# Patient Record
Sex: MALE | Race: WHITE | Employment: FULL TIME | URBAN - NONMETROPOLITAN AREA
[De-identification: names, ages, dates, MRNs, and addresses within clinical notes are randomized per-mention and may not be internally consistent; named-entity substitution may affect disease eponyms.]

---
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
## 2022-01-13 ENCOUNTER — HOSPITAL ENCOUNTER (EMERGENCY)
Age: 22
Discharge: HOME | End: 2022-01-13
Payer: COMMERCIAL

## 2022-01-13 VITALS
OXYGEN SATURATION: 96 % | HEART RATE: 96 BPM | SYSTOLIC BLOOD PRESSURE: 135 MMHG | RESPIRATION RATE: 16 BRPM | DIASTOLIC BLOOD PRESSURE: 83 MMHG | TEMPERATURE: 98.1 F

## 2022-01-13 DIAGNOSIS — L02.413 ABSCESS OF RIGHT FOREARM: Primary | ICD-10-CM

## 2022-01-13 PROCEDURE — 2500000003 HC RX 250 WO HCPCS: Performed by: EMERGENCY MEDICINE

## 2022-01-13 PROCEDURE — 99213 OFFICE O/P EST LOW 20 MIN: CPT | Performed by: EMERGENCY MEDICINE

## 2022-01-13 PROCEDURE — 99213 OFFICE O/P EST LOW 20 MIN: CPT

## 2022-01-13 PROCEDURE — 96372 THER/PROPH/DIAG INJ SC/IM: CPT

## 2022-01-13 PROCEDURE — 87070 CULTURE OTHR SPECIMN AEROBIC: CPT

## 2022-01-13 PROCEDURE — 87205 SMEAR GRAM STAIN: CPT

## 2022-01-13 PROCEDURE — 6360000002 HC RX W HCPCS: Performed by: EMERGENCY MEDICINE

## 2022-01-13 PROCEDURE — 87186 SC STD MICRODIL/AGAR DIL: CPT

## 2022-01-13 PROCEDURE — 87147 CULTURE TYPE IMMUNOLOGIC: CPT

## 2022-01-13 PROCEDURE — 10060 I&D ABSCESS SIMPLE/SINGLE: CPT | Performed by: EMERGENCY MEDICINE

## 2022-01-13 PROCEDURE — 87077 CULTURE AEROBIC IDENTIFY: CPT

## 2022-01-13 RX ORDER — CEPHALEXIN 500 MG/1
500 CAPSULE ORAL 4 TIMES DAILY
Qty: 40 CAPSULE | Refills: 0 | Status: SHIPPED | OUTPATIENT
Start: 2022-01-13 | End: 2022-01-17 | Stop reason: ALTCHOICE

## 2022-01-13 RX ORDER — CLINDAMYCIN HYDROCHLORIDE 300 MG/1
300 CAPSULE ORAL 3 TIMES DAILY
Qty: 30 CAPSULE | Refills: 0 | Status: SHIPPED | OUTPATIENT
Start: 2022-01-13 | End: 2022-01-17 | Stop reason: ALTCHOICE

## 2022-01-13 RX ORDER — GREEN TEA/HOODIA GORDONII 315-12.5MG
1 CAPSULE ORAL 2 TIMES DAILY
Qty: 60 TABLET | Refills: 0 | Status: SHIPPED | OUTPATIENT
Start: 2022-01-13 | End: 2022-02-12

## 2022-01-13 RX ADMIN — LIDOCAINE HYDROCHLORIDE 1000 MG: 10 INJECTION, SOLUTION EPIDURAL; INFILTRATION; INTRACAUDAL; PERINEURAL at 19:30

## 2022-01-13 ASSESSMENT — PAIN DESCRIPTION - LOCATION: LOCATION: ARM

## 2022-01-13 ASSESSMENT — ENCOUNTER SYMPTOMS
COUGH: 0
ABDOMINAL PAIN: 0
SHORTNESS OF BREATH: 0

## 2022-01-13 ASSESSMENT — PAIN DESCRIPTION - DESCRIPTORS: DESCRIPTORS: SHARP

## 2022-01-13 ASSESSMENT — PAIN DESCRIPTION - ORIENTATION: ORIENTATION: RIGHT

## 2022-01-13 ASSESSMENT — PAIN SCALES - GENERAL: PAINLEVEL_OUTOF10: 8

## 2022-01-13 ASSESSMENT — PAIN DESCRIPTION - FREQUENCY: FREQUENCY: CONTINUOUS

## 2022-01-13 NOTE — Clinical Note
Tatevibha Hay was seen and treated in our emergency department on 1/13/2022. He may return to work on 01/17/2022. If you have any questions or concerns, please don't hesitate to call.       JEWELL Gr - CNP

## 2022-01-14 NOTE — ED NOTES
Dressing applied to wound right arm .  Became light headed, was layed down and given gatorade to drink     Danika Brown RN  01/13/22 1956

## 2022-01-14 NOTE — ED PROVIDER NOTES
[facility] En[facility]       CHIEF COMPLAINT       Chief Complaint   Patient presents with    Abscess     right forearm x 2 months as a rash  and  from scratching it has  started swelling last  week with drainage-  insulin dependent diabetic. Nurses Notes reviewed and I agree except as noted in the HPI. HISTORY OF PRESENT ILLNESS   Davi Malik is a 25 y.o. male who presents for complaints of abscess and cellulitis to his right forearm. Patient states he had a nonspecific rash that was itchy to the right forearm. This was there for several weeks. Patient states he was irritated and scratching at it. He states started having mild swelling a few days ago. Today he has a large area of redness that is very tender and sore. No fevers. Patient is a type I diabetic on insulin. HPI    REVIEW OF SYSTEMS     Review of Systems   Constitutional: Negative for activity change, fatigue and fever. Respiratory: Negative for cough and shortness of breath. Gastrointestinal: Negative for abdominal pain. Skin: Positive for wound (abscess right forearm). Neurological: Positive for headaches. Negative for dizziness. PAST MEDICAL HISTORY         Diagnosis Date    Attention deficit hyperactivity disorder (ADHD), predominantly inattentive type 10/15/2019    Hydronephrosis     Type 1 diabetes mellitus without complication ([facility] [address] 2/51/6575       SURGICALHISTORY     Patient  has no past surgical history on file. CURRENT MEDICATIONS       Previous Medications    AMPHETAMINE-DEXTROAMPHETAMINE (ADDERALL XR) 20 MG EXTENDED RELEASE CAPSULE    Take 1 capsule by mouth every morning for 30 days. ATORVASTATIN (LIPITOR) 20 MG TABLET    Take 1 tablet by mouth daily    CICLOPIROX (PENLAC) 8 % SOLUTION    Apply topically nightly for up to 48 weeks. FREESTYLE LITE STRIP    use 1 TEST STRIP to TEST BLOOD SUGAR UP TO 7 TIMES A DAY.     INSULIN ASPART (NOVOLOG) 100 UNIT/ML INJECTION VIAL    Inject into the skin 3 times daily (before meals)    INSULIN GLARGINE (LANTUS) 100 UNIT/ML INJECTION VIAL    Inject 31 Units into the skin nightly        ALLERGIES     Patient is is allergic to codeine and sulfa antibiotics. Patients   Immunization History   Administered Date(s) Administered    DTaP vaccine 2002, 01/09/2003, 04/01/2003, 11/18/2003, 09/11/2007    HPV 9-valent Stephen Schmitt) 09/25/2018, 11/26/2018, 03/25/2019    Hep B/Hib (Comvax) 2002, 01/09/2003, 09/11/2003    Hepatitis A Ped/Adol (Havrix, Vaqta) 08/12/2020    Influenza A ([id number]) Vaccine PF IM 12/02/2009    Influenza Virus Vaccine 09/23/2016, 09/25/2018, 08/14/2019    Influenza, Patrick Sergeant, IM, (6 mo and older Fluzone, Flulaval, Fluarix and 3 yrs and older Afluria) 11/06/2017    Influenza, Quadv, IM, PF (6 mo and older Fluzone, Flulaval, Fluarix, and 3 yrs and older Afluria) 09/11/2019    MMR 09/11/2003, 09/11/2007    Meningococcal MCV4P (Menactra) 09/25/2018    Pneumococcal Conjugate 7-valent (Drenda Slough) 2002, 01/09/2003, 11/18/2003    Polio IPV (IPOL) 2002, 01/09/2003, 04/01/2003, 09/11/2007    Tdap (Boostrix, Adacel) 10/07/2015    Varicella (Varivax) 09/11/2003, 09/11/2007       FAMILY HISTORY     Patient's family history is not on file. SOCIAL HISTORY     Patient  reports that he has never smoked. He has never used smokeless tobacco. He reports previous alcohol use. He reports previous drug use. PHYSICAL EXAM     ED TRIAGE VITALS  BP: 135/83, Temp: 98.1 °F (36.7 °C), Heart Rate: 96, Resp: 16, SpO2: 96 %,Estimated body mass index is 25.57 kg/m² as calculated from the following:    Height as of 11/17/21: 5' 8\" (1.727 m). Weight as of 11/17/21: 168 lb 3.2 oz (76.3 kg). ,No LMP for male patient. Physical Exam  Constitutional:       General: He is not in acute distress. Appearance: He is normal weight. He is not ill-appearing. HENT:      Head: Normocephalic.    Cardiovascular: Rate and Rhythm: Normal rate. Pulmonary:      Effort: Pulmonary effort is normal.   Musculoskeletal:      Right forearm: Swelling present. Arms:       Comments: Patient has large abscess to the right proximal forearm. Palpable swelling over approximate 5 cm diameter area. There is an area that has spontaneous drainage with purulent drainage   Skin:     General: Skin is warm. Capillary Refill: Capillary refill takes less than 2 seconds. Neurological:      Mental Status: He is alert. Psychiatric:         Mood and Affect: Mood normal.         DIAGNOSTIC RESULTS     Labs:No results found for this visit on 01/13/22. IMAGING:    No orders to display         EKG:      URGENT CARE COURSE:     Vitals:    01/13/22 1836   BP: 135/83   Pulse: 96   Resp: 16   Temp: 98.1 °F (36.7 °C)   TempSrc: Temporal   SpO2: 96%       Medications   cefTRIAXone (ROCEPHIN) 1,000 mg in lidocaine 1 % 2.86 mL IM Injection (1,000 mg IntraMUSCular Given 1/13/22 1930)            PROCEDURES:  Incision/Drainage    Date/Time: 1/13/2022 7:24 PM  Performed by: JEWELL Pate CNP  Authorized by: JEWELL Pate CNP     Consent:     Consent obtained:  Verbal    Consent given by:  Patient  Location:     Type:  Abscess    Size:  5cm    Location:  Upper extremity    Upper extremity location:  Arm    Arm location:  R lower arm  Pre-procedure details:     Skin preparation:  Betadine  Anesthesia (see MAR for exact dosages): Anesthesia method:  Local infiltration    Local anesthetic:  Lidocaine 1% w/o epi  Procedure type:     Complexity:  Simple  Procedure details:     Incision types:  Stab incision    Incision depth:  Dermal    Scalpel blade:  11    Wound management:  Probed and deloculated and irrigated with saline    Drainage:  Bloody and purulent    Drainage amount: Moderate    Wound treatment:  Wound left open    Packing materials:  1/4 in gauze  Post-procedure details:     Patient tolerance of procedure:   Tolerated well, no immediate complications        FINAL IMPRESSION      1. Abscess of right forearm          DISPOSITION/ PLAN   Patient presents for large abscess to his right forearm. Cellulitis. I&D was performed with small to moderate amount purulent drainage and blood removed. Packing placed. Patient was given injection of Rocephin at the urgent care. He will be placed on clindamycin, Keflex and probiotic for treatment at home. Advised use warm compresses Tylenol as needed for pain. Follow-up primary care provider for reevaluation next week.   Return if worse        PATIENT REFERRED TO:  JEWELL Medina CNP  [address] / [place] [address]      DISCHARGE MEDICATIONS:  New Prescriptions    CEPHALEXIN (KEFLEX) 500 MG CAPSULE    Take 1 capsule by mouth 4 times daily for 10 days    CLINDAMYCIN (CLEOCIN) 300 MG CAPSULE    Take 1 capsule by mouth 3 times daily for 10 days    PROBIOTIC ACIDOPHILUS (FLORANEX) TABS    Take 1 tablet by mouth 2 times daily       Discontinued Medications    No medications on file       Current Discharge Medication List          JEWELL Padilla CNP    (Please note that portions of this note were completed with a voice recognition program. Efforts were made to edit the dictations but occasionally words are mis-transcribed.)          JEWELL Padilla CNP  01/13/22 1955

## 2022-01-15 LAB
AEROBIC CULTURE: ABNORMAL
AEROBIC CULTURE: ABNORMAL
GRAM STAIN RESULT: ABNORMAL
ORGANISM: ABNORMAL

## 2022-01-17 ENCOUNTER — TELEPHONE (OUTPATIENT)
Dept: FAMILY MEDICINE CLINIC | Age: 22
End: 2022-01-17

## 2022-01-17 DIAGNOSIS — L03.113 CELLULITIS OF RIGHT UPPER EXTREMITY: Primary | ICD-10-CM

## 2022-01-17 RX ORDER — DOXYCYCLINE HYCLATE 100 MG
100 TABLET ORAL 2 TIMES DAILY
Qty: 20 TABLET | Refills: 0 | Status: SHIPPED | OUTPATIENT
Start: 2022-01-17 | End: 2022-01-27

## 2022-01-17 NOTE — TELEPHONE ENCOUNTER
----- Message from JEWELL Humphries - CNP sent at 1/17/2022  8:23 AM EST -----  Let Angela Nunes know the culture from his arm is growing staph, but the clindamycin isn't going to appropriate treat it. He should stop it, and i'm sending in alternative Abx Rx which should take care of it. He can also stop the Keflex.

## 2022-02-16 ENCOUNTER — OFFICE VISIT (OUTPATIENT)
Dept: FAMILY MEDICINE CLINIC | Age: 22
End: 2022-02-16
Payer: COMMERCIAL

## 2022-02-16 VITALS
TEMPERATURE: 98.1 F | HEART RATE: 97 BPM | OXYGEN SATURATION: 97 % | RESPIRATION RATE: 10 BRPM | WEIGHT: 167 LBS | SYSTOLIC BLOOD PRESSURE: 134 MMHG | BODY MASS INDEX: 25.39 KG/M2 | DIASTOLIC BLOOD PRESSURE: 62 MMHG

## 2022-02-16 DIAGNOSIS — R21 RASH AND OTHER NONSPECIFIC SKIN ERUPTION: ICD-10-CM

## 2022-02-16 DIAGNOSIS — F90.0 ATTENTION DEFICIT HYPERACTIVITY DISORDER (ADHD), PREDOMINANTLY INATTENTIVE TYPE: Primary | ICD-10-CM

## 2022-02-16 PROCEDURE — G8427 DOCREV CUR MEDS BY ELIG CLIN: HCPCS | Performed by: NURSE PRACTITIONER

## 2022-02-16 PROCEDURE — 99213 OFFICE O/P EST LOW 20 MIN: CPT | Performed by: NURSE PRACTITIONER

## 2022-02-16 PROCEDURE — G8419 CALC BMI OUT NRM PARAM NOF/U: HCPCS | Performed by: NURSE PRACTITIONER

## 2022-02-16 PROCEDURE — G8484 FLU IMMUNIZE NO ADMIN: HCPCS | Performed by: NURSE PRACTITIONER

## 2022-02-16 PROCEDURE — 1036F TOBACCO NON-USER: CPT | Performed by: NURSE PRACTITIONER

## 2022-02-16 NOTE — PROGRESS NOTES
[facility] at [address] [facility] [address]  Chief Complaint   Patient presents with    Follow-up     ADHD  - better        History obtained from chart review and the patient. SUBJECTIVE:  Rj Randolph is a 25 y.o. male that presents today for follow up ADHD    Taking Adderall inconsistently. He doesn't really notice that much of a difference if he doesn't take it vs if he does. He is working, work is going well. Focus/concentration is good, his job performance is actually doing fine. ADD    Current ADD regimen:  Adderall  Work is going well. Attendance is good. Denies behavioral problems. Denies sleep disturbances or appetite changes. No evidence abuse or diversion. Nausea? No   Chest Pain? No  Headaches? No    He was seen in the UC about 1 month ago for cellulitis of his right arm. He is still having some itching on his right arm,but no pain. The infection is resolved. Age/Gender Health Maintenance    Lipid -   Lab Results   Component Value Date    CHOL 151 08/12/2020    CHOL 243 (H) 10/01/2019     Lab Results   Component Value Date    TRIG 84 08/12/2020    TRIG 167 10/01/2019     Lab Results   Component Value Date    HDL 39 08/12/2020    HDL 53 01/10/2020    HDL 53 10/01/2019     Lab Results   Component Value Date    LDLCALC 95 08/12/2020    [address] 01/10/2020    LDLCALC 157 10/01/2019     DM Screen -   Lab Results   Component Value Date    LABA1C 9.3 (H) 11/17/2021     Colon Cancer Screening - 48  Lung Cancer Screening (Age 54 to [de-identified] with 30 pack year hx, current smoker or quit within past 15 years) - n/a    Tetanus - need  Influenza Vaccine - yearly  Pneumonia Vaccine - 65  Zostavax - 50   HPV Vaccine - complete    PSA testing discussion - 55  AAA Screening - 65    Falls screening - n/a    Current Outpatient Medications   Medication Sig Dispense Refill    betamethasone valerate (VALISONE) 0.1 % cream Apply topically 2 times daily.  45 g 0    ciclopirox (PENLAC) 8 % solution Apply topically nightly for up to 48 weeks. 6 mL 2    atorvastatin (LIPITOR) 20 MG tablet Take 1 tablet by mouth daily 90 tablet 3    FREESTYLE LITE strip use 1 TEST STRIP to TEST BLOOD SUGAR UP TO 7 TIMES A DAY.  insulin glargine (LANTUS) 100 UNIT/ML injection vial Inject 31 Units into the skin nightly       insulin aspart (NOVOLOG) 100 UNIT/ML injection vial Inject into the skin 3 times daily (before meals)       No current facility-administered medications for this visit. Orders Placed This Encounter   Medications    betamethasone valerate (VALISONE) 0.1 % cream     Sig: Apply topically 2 times daily. Dispense:  45 g     Refill:  0         All medications reviewed and reconciled, including OTC and herbal medications. Updated list given to patient. Patient Active Problem List   Diagnosis    Type 1 diabetes mellitus without complication ([facility] [address]    Attention deficit hyperactivity disorder (ADHD), predominantly inattentive type       Past Medical History:   Diagnosis Date    Attention deficit hyperactivity disorder (ADHD), predominantly inattentive type 10/15/2019    Hydronephrosis     Type 1 diabetes mellitus without complication ([facility] [address] 7/05/3131       No past surgical history on file.     Allergies   Allergen Reactions    Codeine Other (See Comments)     Made him very hyper    Sulfa Antibiotics Rash       Social History     Socioeconomic History    Marital status: Single     Spouse name: Not on file    Number of children: Not on file    Years of education: Not on file    Highest education level: Not on file   Occupational History    Not on file   Tobacco Use    Smoking status: Never Smoker    Smokeless tobacco: Never Used   Substance and Sexual Activity    Alcohol use: Not Currently    Drug use: Not Currently    Sexual activity: Never   Other Topics Concern    Not on file   Social History Narrative    Not on file     Social Determinants of Health Financial Resource Strain:     Difficulty of Paying Living Expenses: Not on file   Food Insecurity:     Worried About Running Out of Food in the Last Year: Not on file    Avel of Food in the Last Year: Not on file   Transportation Needs:     Lack of Transportation (Medical): Not on file    Lack of Transportation (Non-Medical): Not on file   Physical Activity:     Days of Exercise per Week: Not on file    Minutes of Exercise per Session: Not on file   Stress:     Feeling of Stress : Not on file   Social Connections:     Frequency of Communication with Friends and Family: Not on file    Frequency of Social Gatherings with Friends and Family: Not on file    Attends Alevism Services: Not on file    Active Member of [address] [place] [address] or Organizations: Not on file    Attends Club or Organization Meetings: Not on file    Marital Status: Not on file   Intimate Partner Violence:     Fear of Current or Ex-Partner: Not on file    Emotionally Abused: Not on file    Physically Abused: Not on file    Sexually Abused: Not on file   Housing Stability:     Unable to Pay for Housing in the Last Year: Not on file    Number of [place] in the Last Year: Not on file    Unstable Housing in the Last Year: Not on file       No family history on file. I have reviewed the patient's past medical history, past surgical history, allergies, medications, social and family history and I have made updates where appropriate.       Review of Systems  Positive responses are highlighted in bold    Constitutional:  Fever, Chills, Night Sweats, Fatigue, Unexpected changes in weight  Eyes:  Eye discharge, Eye pain, Eye redness, Visual disturbances   HENT:  Ear pain, Tinnitus, Nosebleeds, Trouble swallowing, Hearing loss, Sore throat  Cardiovascular:  Chest Pain, Palpitations, Orthopnea, Paroxysmal Nocturnal Dyspnea  Respiratory:  Cough, Wheezing, Shortness of breath, Chest tightness, Apnea  Gastrointestinal:  Nausea, Vomiting, Diarrhea, Constipation, Heartburn, Blood in stool  Genitourinary:  Difficulty or painful urination, Flank pain, Change in frequency, Urgency  Skin:  Color change, Rash, Itching, Wound  Psychiatric:  Hallucinations, Anxiety, Depression, Suicidal ideation  Hematological:  Enlarged glands, Easy bleeding, Easily bruising  Musculoskeletal:  Joint pain, Back pain, Gait problems, Joint swelling, Myalgias  Neurological:  Dizziness, Headaches, Presyncope, Numbness, Seizures, Tremors  Allergy:  Environmental allergies, Food allergies  Endocrine:  Heat Intolerance, Cold Intolerance, Polydipsia, Polyphagia, Polyuria      PHYSICAL EXAM:  Vitals:    02/16/22 1425   BP: 134/62   Pulse: 97   Resp: 10   Temp: 98.1 °F (36.7 °C)   TempSrc: Oral   SpO2: 97%   Weight: 167 lb (75.8 kg)     Body mass index is 25.39 kg/m². VS Reviewed  General Appearance: A&O x 3, No acute distress,well developed and well- nourished  Head: normocephalic and atraumatic  Eyes: pupils equal, round, and reactive to light, extraocular eye movements intact, conjunctivae and eye lids without erythema  Neck: supple and non-tender without mass, no thyromegaly or thyroid nodules, no cervical lymphadenopathy  Pulmonary/Chest: clear to auscultation bilaterally- no wheezes, rales or rhonchi, normal air movement, no respiratory distress or retractions  Cardiovascular: S1 and S2 auscultated w/ RRR. No murmurs, rubs, clicks, or gallops, distal pulses intact. Abdomen: soft, non-tender, non-distended, bowl sounds physiologic,  no rebound or guarding, no masses or hernias noted. Liver and spleen without enlargement. Extremities: no cyanosis, clubbing or edema of the lower extremities  Musculoskeletal: No joint swelling or gross deformity   Neuro:  Alert, 5/5 strength globally and symmetrically  Psych: Affect appropriate. Mood normal. Thought process is normal without evidence of depression or psychosis. Good insight and appropriate interaction.   Cognition and memory appear to be intact. Skin: warm and dry, maculopapular rash right forearm  Lymph:  No cervical, auricular or supraclavicular lymph nodes palpated    ASSESSMENT & PLAN  Suleman Castillo was seen today for follow-up. Diagnoses and all orders for this visit:    Attention deficit hyperactivity disorder (ADHD), predominantly inattentive type    Rash and other nonspecific skin eruption  -     betamethasone valerate (VALISONE) 0.1 % cream; Apply topically 2 times daily. - ok to hold off on Adderall for now since he is functioning fine off stimulants  - has appt with Endo next month for DM follow up  -  Stressed importance of compliance with medications  - rash looks eczematous, no longer infectious start topical steroid    DISPOSITION    Return if symptoms worsen or fail to improve. Suleman Castillo released without restrictions. PATIENT COUNSELING    Counseling was provided today regarding the following topics: Healthy eating habits, Regular exercise, substance abuse and healthy sleep habits. Suleman Castillo received counseling on the following healthy behaviors: medication adherence    Patient given educational materials on: See Attached    I have instructed Suleman Castillo to complete a self tracking handout on none and instructed them to bring it with them to his next appointment. Barriers to learning and self management: none    Discussed use, benefit, and side effects of prescribed medications. Barriers to medication compliance addressed. All patient questions answered. Pt voiced understanding.        Electronically signed by JEWELL Jackson CNP on 2/16/2022 at 2:34 PM

## 2022-08-18 ENCOUNTER — PATIENT MESSAGE (OUTPATIENT)
Dept: FAMILY MEDICINE CLINIC | Age: 22
End: 2022-08-18

## 2022-08-18 DIAGNOSIS — E10.9 TYPE 1 DIABETES MELLITUS WITHOUT COMPLICATION (HCC): Primary | ICD-10-CM

## 2022-08-18 NOTE — TELEPHONE ENCOUNTER
From: Ferdy Benitez  To: Leslye Kayleen  Sent: 8/18/2022 11:09 AM EDT  Subject: Referral     This message is being sent by Miguelito Lewis on behalf of Salazar Wynn.    I was wondering if you could refer me to Dr Live Zamorano for my diabetes? I was at their office with my brother and they said they would follow me all I had to do was be referred.   Their number is [phone]  Fax number [phone]

## 2022-08-29 ENCOUNTER — OFFICE VISIT (OUTPATIENT)
Dept: FAMILY MEDICINE CLINIC | Age: 22
End: 2022-08-29
Payer: COMMERCIAL

## 2022-08-29 VITALS
TEMPERATURE: 98 F | HEIGHT: 68 IN | WEIGHT: 178 LBS | RESPIRATION RATE: 14 BRPM | BODY MASS INDEX: 26.98 KG/M2 | DIASTOLIC BLOOD PRESSURE: 70 MMHG | OXYGEN SATURATION: 96 % | SYSTOLIC BLOOD PRESSURE: 124 MMHG | HEART RATE: 99 BPM

## 2022-08-29 DIAGNOSIS — R04.0 EPISTAXIS: Primary | ICD-10-CM

## 2022-08-29 PROCEDURE — G8427 DOCREV CUR MEDS BY ELIG CLIN: HCPCS | Performed by: NURSE PRACTITIONER

## 2022-08-29 PROCEDURE — 1036F TOBACCO NON-USER: CPT | Performed by: NURSE PRACTITIONER

## 2022-08-29 PROCEDURE — G8419 CALC BMI OUT NRM PARAM NOF/U: HCPCS | Performed by: NURSE PRACTITIONER

## 2022-08-29 PROCEDURE — 99213 OFFICE O/P EST LOW 20 MIN: CPT | Performed by: NURSE PRACTITIONER

## 2022-08-29 RX ORDER — NACL/NAHCO3/HYALURON SOD/ALOE 0.9 %
GEL (GRAM) NASAL
Qty: 30 ML | Refills: 1 | Status: SHIPPED | OUTPATIENT
Start: 2022-08-29

## 2022-08-29 ASSESSMENT — PATIENT HEALTH QUESTIONNAIRE - PHQ9
SUM OF ALL RESPONSES TO PHQ QUESTIONS 1-9: 0
1. LITTLE INTEREST OR PLEASURE IN DOING THINGS: 0
SUM OF ALL RESPONSES TO PHQ9 QUESTIONS 1 & 2: 0
2. FEELING DOWN, DEPRESSED OR HOPELESS: 0
SUM OF ALL RESPONSES TO PHQ QUESTIONS 1-9: 0

## 2022-08-29 NOTE — PROGRESS NOTES
[facility] Medicine at / Brenda [address] [facility] [place] [address]O. [address]  Chief Complaint   Patient presents with    Epistaxis     Started yesterday, two major nosebleeds. Nose still sore. No nose bleeds today. History obtained from chart review and the patient. SUBJECTIVE:  Leopold Hussar is a 25 y.o. male that presents today for epistaxis    Had two very large nosebleeds yesterday, both from right nostril one in the morning and another late evening. He had large amt of blood. No major sinus issues, or allergies, no sinus pressure pain. He reports that he is working at [employer] 3rd shift, and the air has been pretty dry. Age/Gender Health Maintenance    Lipid -   Lab Results   Component Value Date    CHOL 151 08/12/2020    CHOL 243 (H) 10/01/2019     Lab Results   Component Value Date    TRIG 84 08/12/2020    TRIG 167 10/01/2019     Lab Results   Component Value Date    HDL 39 08/12/2020    HDL 53 01/10/2020    HDL 53 10/01/2019     Lab Results   Component Value Date    LDLCALC 95 08/12/2020    [facility] 133 01/10/2020    LDLCALC 157 10/01/2019     DM Screen -   Lab Results   Component Value Date    LABA1C 9.3 (H) 11/17/2021     Colon Cancer Screening - 50  Lung Cancer Screening (Age 54 to [de-identified] with 30 pack year hx, current smoker or quit within past 15 years) - n/a    Tetanus - need  Influenza Vaccine - yearly  Pneumonia Vaccine - 65  Zostavax - 50   HPV Vaccine - complete    PSA testing discussion - 55  AAA Screening - 65    Falls screening - n/a    Current Outpatient Medications   Medication Sig Dispense Refill    Saline (NASOGEL) GEL Apply intranasally as needed 30 mL 1    FREESTYLE LITE strip use 1 TEST STRIP to TEST BLOOD SUGAR UP TO 7 TIMES A DAY.       insulin glargine (LANTUS) 100 UNIT/ML injection vial Inject 39 Units into the skin nightly      insulin aspart (NOVOLOG) 100 UNIT/ML injection vial Inject into the skin 3 times daily (before meals)      ciclopirox (PENLAC) 8 % solution Apply topically nightly for up to 48 weeks. (Patient not taking: Reported on 8/29/2022) 6 mL 2    atorvastatin (LIPITOR) 20 MG tablet Take 1 tablet by mouth daily (Patient not taking: Reported on 8/29/2022) 90 tablet 3     No current facility-administered medications for this visit. Orders Placed This Encounter   Medications    Saline (NASOGEL) GEL     Sig: Apply intranasally as needed     Dispense:  30 mL     Refill:  1           All medications reviewed and reconciled, including OTC and herbal medications. Updated list given to patient. Patient Active Problem List   Diagnosis    Type 1 diabetes mellitus without complication ([facility] [address]    Attention deficit hyperactivity disorder (ADHD), predominantly inattentive type       Past Medical History:   Diagnosis Date    Attention deficit hyperactivity disorder (ADHD), predominantly inattentive type 10/15/2019    Hydronephrosis     Type 1 diabetes mellitus without complication ([facility] 7/31/2044       No past surgical history on file.     Allergies   Allergen Reactions    Codeine Other (See Comments)     Made him very hyper    Sulfa Antibiotics Rash       Social History     Socioeconomic History    Marital status: Single     Spouse name: Not on file    Number of children: Not on file    Years of education: Not on file    Highest education level: Not on file   Occupational History    Not on file   Tobacco Use    Smoking status: Never    Smokeless tobacco: Never   Substance and Sexual Activity    Alcohol use: Not Currently    Drug use: Not Currently    Sexual activity: Never   Other Topics Concern    Not on file   Social History Narrative    Not on file     Social Determinants of Health     Financial Resource Strain: Not on file   Food Insecurity: Not on file   Transportation Needs: Not on file   Physical Activity: Not on file   Stress: Not on file   Social Connections: Not on file   Intimate Partner Violence: Not on file   Housing Stability: Not on file       No family history on file.      I have reviewed the patient's past medical history, past surgical history, allergies, medications, social and family history and I have made updates where appropriate. Review of Systems  Positive responses are highlighted in bold    Constitutional:  Fever, Chills, Night Sweats, Fatigue, Unexpected changes in weight  Eyes:  Eye discharge, Eye pain, Eye redness, Visual disturbances   HENT:  Ear pain, Tinnitus, Nosebleeds, Trouble swallowing, Hearing loss, Sore throat  Cardiovascular:  Chest Pain, Palpitations, Orthopnea, Paroxysmal Nocturnal Dyspnea  Respiratory:  Cough, Wheezing, Shortness of breath, Chest tightness, Apnea  Gastrointestinal:  Nausea, Vomiting, Diarrhea, Constipation, Heartburn, Blood in stool  Genitourinary:  Difficulty or painful urination, Flank pain, Change in frequency, Urgency  Skin:  Color change, Rash, Itching, Wound  Psychiatric:  Hallucinations, Anxiety, Depression, Suicidal ideation  Hematological:  Enlarged glands, Easy bleeding, Easily bruising  Musculoskeletal:  Joint pain, Back pain, Gait problems, Joint swelling, Myalgias  Neurological:  Dizziness, Headaches, Presyncope, Numbness, Seizures, Tremors  Allergy:  Environmental allergies, Food allergies  Endocrine:  Heat Intolerance, Cold Intolerance, Polydipsia, Polyphagia, Polyuria      PHYSICAL EXAM:  Vitals:    08/29/22 1359   BP: 124/70   Pulse: 99   Resp: 14   Temp: 98 °F (36.7 °C)   TempSrc: Oral   SpO2: 96%   Weight: 178 lb (80.7 kg)   Height: 5' 8\" (1.727 m)     Body mass index is 27.06 kg/m².          VS Reviewed  General Appearance: A&O x 3, No acute distress,well developed and well- nourished  Head: normocephalic and atraumatic  Eyes: pupils equal, round, and reactive to light, extraocular eye movements intact, conjunctivae and eye lids without erythema  ENT: bilat nasal turbs normal  Neck: supple and non-tender without mass, no thyromegaly or thyroid nodules, no cervical lymphadenopathy  Pulmonary/Chest: clear to auscultation bilaterally- no wheezes, rales or rhonchi, normal air movement, no respiratory distress or retractions  Cardiovascular: S1 and S2 auscultated w/ RRR. No murmurs, rubs, clicks, or gallops, distal pulses intact. Abdomen: soft, non-tender, non-distended, bowl sounds physiologic,  no rebound or guarding, no masses or hernias noted. Liver and spleen without enlargement. Extremities: no cyanosis, clubbing or edema of the lower extremities  Musculoskeletal: No joint swelling or gross deformity   Neuro:  Alert, 5/5 strength globally and symmetrically  Psych: Affect appropriate. Mood normal. Thought process is normal without evidence of depression or psychosis. Good insight and appropriate interaction. Cognition and memory appear to be intact. Skin: warm and dry, maculopapular rash right forearm  Lymph:  No cervical, auricular or supraclavicular lymph nodes palpated    ASSESSMENT & PLAN  Phuc Valdivia was seen today for epistaxis. Diagnoses and all orders for this visit:    Epistaxis  -     Saline (NASOGEL) GEL; Apply intranasally as needed    - start with intranasal moisturizer  - home care instructions given    DISPOSITION    Return if symptoms worsen or fail to improve. Phuc Valdivia released without restrictions. PATIENT COUNSELING    Counseling was provided today regarding the following topics: Healthy eating habits, Regular exercise, substance abuse and healthy sleep habits. Phuc Valdivia received counseling on the following healthy behaviors: medication adherence    Patient given educational materials on: See Attached    I have instructed Phuc Valdivia to complete a self tracking handout on none and instructed them to bring it with them to his next appointment. Barriers to learning and self management: none    Discussed use, benefit, and side effects of prescribed medications. Barriers to medication compliance addressed. All patient questions answered. Pt voiced understanding.        Electronically signed by JEWELL Wagner Caro - CNP on 8/29/2022 at 2:14 PM

## 2022-12-07 ENCOUNTER — OFFICE VISIT (OUTPATIENT)
Dept: INTERNAL MEDICINE CLINIC | Age: 22
End: 2022-12-07
Payer: COMMERCIAL

## 2022-12-07 VITALS — WEIGHT: 189 LBS | BODY MASS INDEX: 28.64 KG/M2 | HEIGHT: 68 IN

## 2022-12-07 DIAGNOSIS — E10.9 TYPE 1 DIABETES MELLITUS WITHOUT COMPLICATION (HCC): Primary | ICD-10-CM

## 2022-12-07 PROCEDURE — 97802 MEDICAL NUTRITION INDIV IN: CPT | Performed by: DIETITIAN, REGISTERED

## 2022-12-07 NOTE — PATIENT INSTRUCTIONS
Cut down on the high sugar cereal and mix with plain cereal.  - Add protein with this meal - ex. Nuts, pbutter, egg, lunchmeat. 1.)  Get familiar with reading the nutrition facts label by looking at serving size and total carbohydrates. - Without a label refer to your carb count guide booklet. 2.)  Measure foods for accuracy in carb counting.    3.)  Healthy carb choices:  whole grains, whole wheat pasta, starchy vegetables, fresh fruit and lowfat/non-fat milk and yogurt. 4.)  Your meal plan is found on the inside cover of your carb counting guide booklet:  1st meal or Brkf:  45 gms carbohydrates + 1-2 oz protein  2nd meal or Lunch: 45-60 gms carbohydrates + 2-3 oz protein + non-starchy veggies  3rd meal or Dinner:  60 gms carbohydrates + 3 oz protein + non- starchy veggies  Snack time:  15 - 30 gms carbohydrates + 1 oz protein  4th meal: 45-60 gms carbs + 2-3 oz protein + non-starchy veggies. 5.)  Choose lean protein most of the time and Cut in 1/2 added fats to help with weight loss efforts. 6.) Bring a 1-2 week food log and meter to next dietitian appt. Thanks.   Check BS:  4x/day  Fasting BS (1st thing in the morning) or before a meal (at least 4 hour since last ate), BS goal:  90 - 130  2 hours after the start of a meal, BS goal:  100 - 150

## 2022-12-07 NOTE — PROGRESS NOTES
[address] [place] [address]. [address] [place] [address] Rafael., [facility]Lidia [facility], [address]  [phone] (phone)  [phone] (fax)    Patient Name: Daja Henry. Date of Birth: [birth year]. MRN: [id number]      Assessment: Patient is a 23 y.o. male seen for Initial MNT visit for Type 1 DB.     -Nutritionally relevant labs:   Lab Results   Component Value Date/Time    LABA1C 9.3 (H) 11/17/2021 01:38 PM    LABA1C 8.5 (H) 08/12/2020 08:41 AM    LABA1C 7.5 (H) 10/01/2019 08:53 AM    LABA1C 7.6 06/13/2019 12:00 AM    GLUCOSE 165 08/19/2020 02:55 PM    GLUCOSE 348 (H) 08/19/2020 12:39 PM    CHOL 151 08/12/2020 08:41 AM    HDL 39 08/12/2020 08:41 AM    LDLCALC 95 08/12/2020 08:41 AM    TRIG 84 08/12/2020 08:41 AM     DB Meds:  Basal /Bolus. I:D ratio of 1:6 @ brkf., 1:5 at other meal times. 40 units Lantus at night. Correction - BS reading - 120 then divided by 40. Ex.  - 120 = 230/40 = 5.7 (6 units)    -Blood sugar trends: Checking with meter - downloaded and reviewed and plan to scan in EHR. Pt has a Dexcom but does not use because he wants the accountability of checking his BS with a meter. Checking BS 3-4x/day  Many High BS's (350 - HI) most often when at work during the night and evenings. Average:  313  Pt attributes high BS readings to over-correcting low BS's. He drink pr eat something at the house when low - ex cereal.    Pt here with his girlfriend Rocky. Pt states with DB since age of 17. 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 Household - includes mom & step father. Girlfriend is over a lot. She states she does the cooking and likes to bake. Food Recall: To bed when home 8 - 830 am - 2/3 pm  Brkf at 8/830 am - bowl cereal 2 cups (apple jacks or fruity juan alberto) 1% milk   Then to Bed on work nights. ~ 3 pm - leftovers.   Dinner later - 7-830 pm.  630 - 7 pm - last night - ordered [employer] - popcorn bowl - mashed potatoes, corn and popcorn chicken on top and gravy and cheese. 1st break @ work ~ 1230 am -  Hemal salad. Lunch - 230 - 3 am - packs sandwich, applesauce, small bag of chips and drink (Propel) OR grabs something to eat from work. Does not eat at his last break    Likes fruit and vegetables. Fast food - 4-5x/week. Street taco and other tacos ([facility]) - corn tortilla - steak quesadilla and sometimes street corn or chicken taco with this. Girlfriend states he is a fast eater.    -Main Beverages: Propels. Does not drink water much - trying to drink more. Has a 32 oz container that he fills up.    -Impression of Dietary Intake: on average, 4 meals per day, on average, 5 fast food meals per week, high fat/ cholesterol, lacking routine intake of fruits and vegetables, processed carb choices    Current Outpatient Medications on File Prior to Visit   Medication Sig Dispense Refill    insulin glargine (LANTUS) 100 UNIT/ML injection vial Inject 40 Units into the skin nightly 10 mL 0    FREESTYLE LITE strip use 1 TEST STRIP to TEST BLOOD SUGAR UP TO 7 TIMES A DAY. 100 each 3    ibuprofen (ADVIL;MOTRIN) 200 MG tablet Take 200 mg by mouth as needed      blood glucose test strips (FREESTYLE LITE) strip Check blood sugars 4x/day 100 strip 3    Saline (NASOGEL) GEL Apply intranasally as needed 30 mL 1    insulin aspart (NOVOLOG) 100 UNIT/ML injection vial Inject into the skin 3 times daily (before meals)      Continuous Blood Gluc Sensor (DEXCOM G6 SENSOR) MISC Check blood sugars 4x/day (Patient not taking: Reported on 12/7/2022) 9 each 1     No current facility-administered medications on file prior to visit. Vitals from current and previous visits:  Ht 5' 8\" (1.727 m)   Wt 189 lb (85.7 kg)   BMI 28.74 kg/m²     -Body mass index is 28.74 kg/m². 25-29.9 - Overweight.   -Weight goal: lose weight.      Nutrition Diagnosis:   Limited adherence to nutrition recommendations related to Lack of previous MNT/currently undergoing MNT as evidenced by Conditions associated with a diagnosis or treatment: Type 1DB. Intervention:  -Instructed the patient on: Carbohydrate Counting, Consistent Carbohydrate Intake, Food Label Reading, and Meal Planning for Regular, Balanced Meals & Snacks, how to treat a low BS,  limiting milk to 1 cup at a time and count into carb budget, high iron food choices - Beef and dark veggies (patient wanting to know what foods are high in iron). -Handouts given for: carbohydrate counting, food logging, healthy snacks x2, 200 gm sample meal plans/menus, and fancy plate pics. , how to treat a low BS    Mealtimes:    8-830 am   2-3 pm   7-8 pm Snack time at Work: 1230 - 1 am.    230 - 3 am.     Patient Instructions   Cut down on the high sugar cereal and mix with plain cereal.  - Add protein with this meal - ex. Nuts, pbutter, egg, lunchmeat. 1.)  Get familiar with reading the nutrition facts label by looking at serving size and total carbohydrates. - Without a label refer to your carb count guide booklet. 2.)  Measure foods for accuracy in carb counting.    3.)  Healthy carb choices:  whole grains, whole wheat pasta, starchy vegetables, fresh fruit and lowfat/non-fat milk and yogurt. 4.)  Your meal plan is found on the inside cover of your carb counting guide booklet:  1st meal or Brkf:  45 gms carbohydrates + 1-2 oz protein  2nd meal or Lunch: 45-60 gms carbohydrates + 2-3 oz protein + non-starchy veggies  3rd meal or Dinner:  60 gms carbohydrates + 3 oz protein + non- starchy veggies  Snack time:  15 - 30 gms carbohydrates + 1 oz protein  4th meal: 45-60 gms carbs + 2-3 oz protein + non-starchy veggies. 5.)  Choose lean protein most of the time and Cut in 1/2 added fats to help with weight loss efforts. 6.) Bring a 1-2 week food log and meter to next dietitian appt. Thanks.   Check BS:  4x/day  Fasting BS (1st thing in the morning) or before a meal (at least 4 hour since last ate), BS goal:  90 - 130  2 hours after the start of a meal, BS goal:  100 - 150     -Nutrition prescription: 3544-0083 calories/day, 200-225 g carbs/day. Adj.wt:163# (74 kg)    Comprehension verified using teachback method. Monitoring/Evaluation:   -Followup visit: 6 weeks with PharmD, 7 weeks with dietitian, 2.5 mo with RN-CDE.   -Receptiveness to education/goals: Agreeable.  -Evaluation of education: Indicates understanding.  -Readiness to change: precontemplative- carb counting accurately with reading labels and measuring foods for foods without a label, looking food up and their carb amounts, balanced meal planning.  -Expected compliance: fair. Thank you for your referral of this patient. Total time involved in direct patient education: 75 minutes for initial MNT visit.

## 2023-01-17 ENCOUNTER — NURSE ONLY (OUTPATIENT)
Dept: LAB | Age: 23
End: 2023-01-17

## 2023-01-17 DIAGNOSIS — R00.0 TACHYCARDIA: ICD-10-CM

## 2023-01-17 DIAGNOSIS — E10.65 TYPE 1 DIABETES MELLITUS WITH HYPERGLYCEMIA (HCC): ICD-10-CM

## 2023-01-17 LAB
ALBUMIN SERPL-MCNC: 4 G/DL (ref 3.5–5.1)
ALP BLD-CCNC: 151 U/L (ref 38–126)
ALT SERPL-CCNC: 21 U/L (ref 11–66)
ANION GAP SERPL CALCULATED.3IONS-SCNC: 12 MEQ/L (ref 8–16)
AST SERPL-CCNC: 22 U/L (ref 5–40)
AVERAGE GLUCOSE: 204 MG/DL (ref 70–126)
BACTERIA: ABNORMAL
BILIRUB SERPL-MCNC: 0.3 MG/DL (ref 0.3–1.2)
BILIRUBIN URINE: NEGATIVE
BLOOD, URINE: NEGATIVE
BUN BLDV-MCNC: 18 MG/DL (ref 7–22)
CALCIUM SERPL-MCNC: 9.1 MG/DL (ref 8.5–10.5)
CASTS: ABNORMAL /LPF
CASTS: ABNORMAL /LPF
CHARACTER, URINE: ABNORMAL
CHLORIDE BLD-SCNC: 102 MEQ/L (ref 98–111)
CHOLESTEROL, TOTAL: 227 MG/DL (ref 100–199)
CO2: 26 MEQ/L (ref 23–33)
COLOR: YELLOW
CREAT SERPL-MCNC: 0.7 MG/DL (ref 0.4–1.2)
CREATININE, URINE: 84.7 MG/DL
CRYSTALS: ABNORMAL
EPITHELIAL CELLS, UA: ABNORMAL /HPF
GFR SERPL CREATININE-BSD FRML MDRD: > 60 ML/MIN/1.73M2
GLUCOSE BLD-MCNC: 133 MG/DL (ref 70–108)
GLUCOSE, URINE: >= 1000 MG/DL
HBA1C MFR BLD: 8.8 % (ref 4.4–6.4)
HDLC SERPL-MCNC: 51 MG/DL
KETONES, URINE: NEGATIVE
LDL CHOLESTEROL CALCULATED: 145 MG/DL
LEUKOCYTE ESTERASE, URINE: NEGATIVE
MAGNESIUM: 1.8 MG/DL (ref 1.6–2.4)
MICROALBUMIN UR-MCNC: < 1.2 MG/DL
MICROALBUMIN/CREAT UR-RTO: 14 MG/G (ref 0–30)
MISCELLANEOUS LAB TEST RESULT: ABNORMAL
NITRITE, URINE: NEGATIVE
PH UA: 7 (ref 5–9)
PHOSPHORUS: 3.7 MG/DL (ref 2.4–4.7)
POTASSIUM SERPL-SCNC: 4.4 MEQ/L (ref 3.5–5.2)
PROTEIN UA: NEGATIVE MG/DL
RBC URINE: ABNORMAL /HPF
RENAL EPITHELIAL, UA: ABNORMAL
SODIUM BLD-SCNC: 140 MEQ/L (ref 135–145)
SPECIFIC GRAVITY UA: 1.02 (ref 1–1.03)
TOTAL PROTEIN: 6.8 G/DL (ref 6.1–8)
TRIGL SERPL-MCNC: 156 MG/DL (ref 0–199)
TSH SERPL DL<=0.05 MIU/L-ACNC: 0.96 UIU/ML (ref 0.4–4.2)
UROBILINOGEN, URINE: 0.2 EU/DL (ref 0–1)
WBC UA: ABNORMAL /HPF
YEAST: ABNORMAL

## 2023-02-28 ENCOUNTER — OFFICE VISIT (OUTPATIENT)
Dept: INTERNAL MEDICINE CLINIC | Age: 23
End: 2023-02-28
Payer: COMMERCIAL

## 2023-02-28 VITALS
WEIGHT: 190.2 LBS | DIASTOLIC BLOOD PRESSURE: 84 MMHG | BODY MASS INDEX: 28.82 KG/M2 | HEIGHT: 68 IN | SYSTOLIC BLOOD PRESSURE: 110 MMHG | HEART RATE: 99 BPM | TEMPERATURE: 97.6 F

## 2023-02-28 DIAGNOSIS — E10.9 TYPE 1 DIABETES MELLITUS WITHOUT COMPLICATION (HCC): Primary | ICD-10-CM

## 2023-02-28 PROCEDURE — G0108 DIAB MANAGE TRN  PER INDIV: HCPCS | Performed by: INTERNAL MEDICINE

## 2023-02-28 NOTE — PROGRESS NOTES
Diabetes Mellitus Type I, Initial:  Dr. Gonzalez Aver  Patient here for evaluation of Type 1 diabetes mellitus. The initial diagnosis of diabetes was made approx . The patient's diagnosis of diabetes mellitus was made . His clinical course has fluctuated. Insulin dosage review with Maddie Agudelo suggested noncompliance some of the time. Associated symptoms of hyperglycemia have been with BS's over 300-frequent urination. Associated symptoms of hypoglycemia have been with BS's 70's: hungry; dizzy. 2-3 times per week during the afternoon 2-3pm.     He is currently taking Glargine 38 units at 6-7pm with dinner. Humalog 1:4 grams bkfst and 1:5 for lunch and dinner                Corrections: 1:40mg for BS over 120  Insulin injections are given by patient. Insulin pens. Last eye Exam: ; [facility]  Lat Foot Exam: 2022  Depression screenin2022  Compliance with blood glucose monitoring: good. Dexcom CGM  The patient does perform independently. 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 B HN Cheerios 1cup/ milk 47gm            L Ramen Cup 38gm            D McChicken/ med mann              Snacks: rare- may have snack at work-sm bag chips/ cantelope--doses Humalog                    Bedtime days off-none. Rare popcorn-doses Humalog            Beverages: water- 36oz/ day; occas lemonade/ koolaid; diet pop-                   2 per day  Blood glucose times and ranges: Did not bring Dexcom CGM reader. Breakfast \"210's\"            Lunch  \"'s\"            Dinner    \"190's\"            HS   \"200's\"  1.5 hrpp glucose in office today:  326  MedicAlert Identification Noted? no      Focus  Initial visit for Diabetes education. A1C 23 8.8%. Karinarustam Jaramillones has had Diabetes for approx 14 years.  He reports he kept A1C's under 7.5% most of those years. He states his recent A1C's may be higher r/t more independent management, varied schedule (working nights) and emotional issues. He is lives intermittently with his girlfriend or his parents and manages his Diabetes on his own. He is guessing at many carb counts. He denies missing any insulin injections, but may be giving some injections into scar tissue. Reviewed sit rotation. 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 He is not sure he wants to take this approach. Encouraged him to look into this further. He did not bring his Dexcom reader to this appointment and is asked to return with it to download this week. Scott Anand voiced understanding. Follow up 1 month. Plan  Reviewed BG/ A1C goals; insulin action/ site rotation; treating low BS's; exercise; gluconeogenesis; insulin pump therapy; carb counting. Make an appointment to get  your eyes checked  Keep a food log at least the week before seeing the dietician                Add the carb counts  Start using online carb counting help               --[employer] fast food restaurants. Use calorieking. com  Think about insulin pump therapy --OmniPod  Bring your Dexcom to the Diabetes Clinic today, Wed or Thursday        To be downloaded. 8am-4:30pm             Avoid Wed at noon  Always carry treatment for low blood sugar 15 grams rapid glucose              --4 glucose tabs, 3 rolls smarties, sweettarts, 1 tablespoon honey  Meter download, medications, PMH and nursing assessment reviewed. Oris New states He is willing to participate in this plan of care and verbalized understanding of all instructions provided. Teach back used to verify comprehension. Total time involved in direct patient education: 60 minutes.

## 2023-02-28 NOTE — PATIENT INSTRUCTIONS
Make an appointment to get  your eyes checked  Keep a food log at least the week before seeing the dietician                Add the carb counts  Start using online carb counting help               --google fast food restaurants. Use calorieking. com  Think about insulin pump therapy --OmniPod  Bring your Dexcom to the Diabetes Clinic today, Wed or Thursday        To be downloaded.  8am-4:30pm             Avoid Wed at noon  Always carry treatment for low blood sugar 15 grams rapid glucose              --4 glucose tabs, 3 rolls smarties, sweettarts, 1 tablespoon                  honey

## 2023-04-10 ENCOUNTER — ENROLLMENT (OUTPATIENT)
Dept: INTERNAL MEDICINE CLINIC | Age: 23
End: 2023-04-10

## 2023-05-01 ENCOUNTER — TELEPHONE (OUTPATIENT)
Dept: INTERNAL MEDICINE CLINIC | Age: 23
End: 2023-05-01

## 2023-05-25 ENCOUNTER — HOSPITAL ENCOUNTER (INPATIENT)
Age: 23
LOS: 1 days | Discharge: HOME | End: 2023-05-27
Attending: EMERGENCY MEDICINE | Admitting: STUDENT IN AN ORGANIZED HEALTH CARE EDUCATION/TRAINING PROGRAM
Payer: COMMERCIAL

## 2023-05-25 ENCOUNTER — APPOINTMENT (OUTPATIENT)
Dept: GENERAL RADIOLOGY | Age: 23
End: 2023-05-25
Payer: COMMERCIAL

## 2023-05-25 DIAGNOSIS — R07.9 CHEST PAIN, UNSPECIFIED TYPE: ICD-10-CM

## 2023-05-25 DIAGNOSIS — E10.10 DKA, TYPE 1, NOT AT GOAL (HCC): ICD-10-CM

## 2023-05-25 DIAGNOSIS — E10.10 DIABETIC KETOACIDOSIS WITHOUT COMA ASSOCIATED WITH TYPE 1 DIABETES MELLITUS (HCC): Primary | ICD-10-CM

## 2023-05-25 LAB
BASOPHILS ABSOLUTE: 0.1 THOU/MM3 (ref 0–0.1)
BASOPHILS NFR BLD AUTO: 0.7 %
CHP ED QC CHECK: 502
DEPRECATED RDW RBC AUTO: 45 FL (ref 35–45)
EOSINOPHIL NFR BLD AUTO: 0.2 %
EOSINOPHILS ABSOLUTE: 0 THOU/MM3 (ref 0–0.4)
ERYTHROCYTE [DISTWIDTH] IN BLOOD BY AUTOMATED COUNT: 12.7 % (ref 11.5–14.5)
GLUCOSE BLD STRIP.AUTO-MCNC: 502 MG/DL (ref 70–108)
HCT VFR BLD AUTO: 45.4 % (ref 42–52)
HGB BLD-MCNC: 14.5 GM/DL (ref 14–18)
IMM GRANULOCYTES # BLD AUTO: 0.05 THOU/MM3 (ref 0–0.07)
IMM GRANULOCYTES NFR BLD AUTO: 0.4 %
LYMPHOCYTES ABSOLUTE: 2.6 THOU/MM3 (ref 1–4.8)
LYMPHOCYTES NFR BLD AUTO: 20.1 %
MCH RBC QN AUTO: 30.5 PG (ref 26–33)
MCHC RBC AUTO-ENTMCNC: 31.9 GM/DL (ref 32.2–35.5)
MCV RBC AUTO: 95.4 FL (ref 80–94)
MONOCYTES ABSOLUTE: 0.6 THOU/MM3 (ref 0.4–1.3)
MONOCYTES NFR BLD AUTO: 4.6 %
NEUTROPHILS NFR BLD AUTO: 74 %
NRBC BLD AUTO-RTO: 0 /100 WBC
PLATELET # BLD AUTO: 354 THOU/MM3 (ref 130–400)
PMV BLD AUTO: 10 FL (ref 9.4–12.4)
RBC # BLD AUTO: 4.76 MILL/MM3 (ref 4.7–6.1)
SEGMENTED NEUTROPHILS ABSOLUTE COUNT: 9.5 THOU/MM3 (ref 1.8–7.7)
WBC # BLD AUTO: 12.9 THOU/MM3 (ref 4.8–10.8)

## 2023-05-25 PROCEDURE — 82948 REAGENT STRIP/BLOOD GLUCOSE: CPT

## 2023-05-25 PROCEDURE — 80048 BASIC METABOLIC PNL TOTAL CA: CPT

## 2023-05-25 PROCEDURE — 71045 X-RAY EXAM CHEST 1 VIEW: CPT

## 2023-05-25 PROCEDURE — 93005 ELECTROCARDIOGRAM TRACING: CPT | Performed by: EMERGENCY MEDICINE

## 2023-05-25 PROCEDURE — 82010 KETONE BODYS QUAN: CPT

## 2023-05-25 PROCEDURE — 99285 EMERGENCY DEPT VISIT HI MDM: CPT

## 2023-05-25 PROCEDURE — 36415 COLL VENOUS BLD VENIPUNCTURE: CPT

## 2023-05-25 PROCEDURE — 84484 ASSAY OF TROPONIN QUANT: CPT

## 2023-05-25 PROCEDURE — 85025 COMPLETE CBC W/AUTO DIFF WBC: CPT

## 2023-05-25 ASSESSMENT — PAIN - FUNCTIONAL ASSESSMENT: PAIN_FUNCTIONAL_ASSESSMENT: NONE - DENIES PAIN

## 2023-05-26 PROBLEM — E10.10 DKA, TYPE 1, NOT AT GOAL (HCC): Status: ACTIVE | Noted: 2023-05-26

## 2023-05-26 LAB
ANION GAP SERPL CALC-SCNC: 11 MEQ/L (ref 8–16)
ANION GAP SERPL CALC-SCNC: 11 MEQ/L (ref 8–16)
ANION GAP SERPL CALC-SCNC: 12 MEQ/L (ref 8–16)
ANION GAP SERPL CALC-SCNC: 15 MEQ/L (ref 8–16)
ANION GAP SERPL CALC-SCNC: 19 MEQ/L (ref 8–16)
ANION GAP SERPL CALC-SCNC: 20 MEQ/L (ref 8–16)
ANION GAP SERPL CALC-SCNC: 33 MEQ/L (ref 8–16)
B-OH-BUTYR SERPL-MSCNC: 79.01 MG/DL (ref 0.2–2.81)
BASE EXCESS BLDA CALC-SCNC: -13.3 MMOL/L (ref -2–3)
BASE EXCESS BLDA CALC-SCNC: -5.8 MMOL/L (ref -2–3)
BILIRUB UR QL STRIP.AUTO: NEGATIVE
BUN SERPL-MCNC: 12 MG/DL (ref 7–22)
BUN SERPL-MCNC: 14 MG/DL (ref 7–22)
BUN SERPL-MCNC: 5 MG/DL (ref 7–22)
BUN SERPL-MCNC: 5 MG/DL (ref 7–22)
BUN SERPL-MCNC: 7 MG/DL (ref 7–22)
BUN SERPL-MCNC: 8 MG/DL (ref 7–22)
BUN SERPL-MCNC: 9 MG/DL (ref 7–22)
CA-I BLD ISE-SCNC: 1.05 MMOL/L (ref 1.12–1.32)
CA-I BLD ISE-SCNC: 1.15 MMOL/L (ref 1.12–1.32)
CALCIUM SERPL-MCNC: 7.7 MG/DL (ref 8.5–10.5)
CALCIUM SERPL-MCNC: 7.9 MG/DL (ref 8.5–10.5)
CALCIUM SERPL-MCNC: 8.1 MG/DL (ref 8.5–10.5)
CALCIUM SERPL-MCNC: 8.2 MG/DL (ref 8.5–10.5)
CALCIUM SERPL-MCNC: 8.2 MG/DL (ref 8.5–10.5)
CALCIUM SERPL-MCNC: 8.6 MG/DL (ref 8.5–10.5)
CALCIUM SERPL-MCNC: 9.1 MG/DL (ref 8.5–10.5)
CHARACTER UR: CLEAR
CHLORIDE SERPL-SCNC: 101 MEQ/L (ref 98–111)
CHLORIDE SERPL-SCNC: 101 MEQ/L (ref 98–111)
CHLORIDE SERPL-SCNC: 104 MEQ/L (ref 98–111)
CHLORIDE SERPL-SCNC: 105 MEQ/L (ref 98–111)
CHLORIDE SERPL-SCNC: 105 MEQ/L (ref 98–111)
CHLORIDE SERPL-SCNC: 107 MEQ/L (ref 98–111)
CHLORIDE SERPL-SCNC: 89 MEQ/L (ref 98–111)
CHOLEST SERPL-MCNC: 283 MG/DL (ref 100–199)
CK SERPL-CCNC: 68 U/L (ref 55–170)
CO2 SERPL-SCNC: 11 MEQ/L (ref 23–33)
CO2 SERPL-SCNC: 16 MEQ/L (ref 23–33)
CO2 SERPL-SCNC: 18 MEQ/L (ref 23–33)
CO2 SERPL-SCNC: 19 MEQ/L (ref 23–33)
CO2 SERPL-SCNC: 20 MEQ/L (ref 23–33)
CO2 SERPL-SCNC: 21 MEQ/L (ref 23–33)
CO2 SERPL-SCNC: 22 MEQ/L (ref 23–33)
COLLECTED BY:: ABNORMAL
COLLECTED BY:: ABNORMAL
COLOR: YELLOW
CREAT SERPL-MCNC: 0.6 MG/DL (ref 0.4–1.2)
CREAT SERPL-MCNC: 0.7 MG/DL (ref 0.4–1.2)
CREAT SERPL-MCNC: 0.9 MG/DL (ref 0.4–1.2)
DEPRECATED MEAN GLUCOSE BLD GHB EST-ACNC: 276 MG/DL (ref 70–126)
GFR SERPL CREATININE-BSD FRML MDRD: > 60 ML/MIN/1.73M2
GLUCOSE BLD STRIP.AUTO-MCNC: 106 MG/DL (ref 70–108)
GLUCOSE BLD STRIP.AUTO-MCNC: 111 MG/DL (ref 70–108)
GLUCOSE BLD STRIP.AUTO-MCNC: 120 MG/DL (ref 70–108)
GLUCOSE BLD STRIP.AUTO-MCNC: 136 MG/DL (ref 70–108)
GLUCOSE BLD STRIP.AUTO-MCNC: 139 MG/DL (ref 70–108)
GLUCOSE BLD STRIP.AUTO-MCNC: 141 MG/DL (ref 70–108)
GLUCOSE BLD STRIP.AUTO-MCNC: 147 MG/DL (ref 70–108)
GLUCOSE BLD STRIP.AUTO-MCNC: 148 MG/DL (ref 70–108)
GLUCOSE BLD STRIP.AUTO-MCNC: 152 MG/DL (ref 70–108)
GLUCOSE BLD STRIP.AUTO-MCNC: 152 MG/DL (ref 70–108)
GLUCOSE BLD STRIP.AUTO-MCNC: 156 MG/DL (ref 70–108)
GLUCOSE BLD STRIP.AUTO-MCNC: 162 MG/DL (ref 70–108)
GLUCOSE BLD STRIP.AUTO-MCNC: 166 MG/DL (ref 70–108)
GLUCOSE BLD STRIP.AUTO-MCNC: 178 MG/DL (ref 70–108)
GLUCOSE BLD STRIP.AUTO-MCNC: 183 MG/DL (ref 70–108)
GLUCOSE BLD STRIP.AUTO-MCNC: 211 MG/DL (ref 70–108)
GLUCOSE BLD STRIP.AUTO-MCNC: 212 MG/DL (ref 70–108)
GLUCOSE BLD STRIP.AUTO-MCNC: 220 MG/DL (ref 70–108)
GLUCOSE BLD STRIP.AUTO-MCNC: 243 MG/DL (ref 70–108)
GLUCOSE BLD STRIP.AUTO-MCNC: 254 MG/DL (ref 70–108)
GLUCOSE BLD STRIP.AUTO-MCNC: 43 MG/DL (ref 70–108)
GLUCOSE BLD STRIP.AUTO-MCNC: 65 MG/DL (ref 70–108)
GLUCOSE BLD STRIP.AUTO-MCNC: 65 MG/DL (ref 70–108)
GLUCOSE BLD STRIP.AUTO-MCNC: 67 MG/DL (ref 70–108)
GLUCOSE BLD STRIP.AUTO-MCNC: 78 MG/DL (ref 70–108)
GLUCOSE BLD STRIP.AUTO-MCNC: 97 MG/DL (ref 70–108)
GLUCOSE SERPL-MCNC: 122 MG/DL (ref 70–108)
GLUCOSE SERPL-MCNC: 126 MG/DL (ref 70–108)
GLUCOSE SERPL-MCNC: 161 MG/DL (ref 70–108)
GLUCOSE SERPL-MCNC: 172 MG/DL (ref 70–108)
GLUCOSE SERPL-MCNC: 250 MG/DL (ref 70–108)
GLUCOSE SERPL-MCNC: 483 MG/DL (ref 70–108)
GLUCOSE SERPL-MCNC: 69 MG/DL (ref 70–108)
GLUCOSE UR QL STRIP.AUTO: >= 1000 MG/DL
HBA1C MFR BLD HPLC: 11.2 % (ref 4.4–6.4)
HCO3 BLDA-SCNC: 11 MMOL/L (ref 23–28)
HCO3 BLDA-SCNC: 19 MMOL/L (ref 23–28)
HDLC SERPL-MCNC: 46 MG/DL
HGB UR QL STRIP.AUTO: NEGATIVE
KETONES UR QL STRIP.AUTO: >= 160
LACTATE SERPL-SCNC: 0.7 MMOL/L (ref 0.5–2)
LDLC SERPL CALC-MCNC: 182 MG/DL
LV EF: 60 %
LVEF MODALITY: NORMAL
MAGNESIUM SERPL-MCNC: 1.6 MG/DL (ref 1.6–2.4)
MAGNESIUM SERPL-MCNC: 1.7 MG/DL (ref 1.6–2.4)
MAGNESIUM SERPL-MCNC: 1.8 MG/DL (ref 1.6–2.4)
MAGNESIUM SERPL-MCNC: 2 MG/DL (ref 1.6–2.4)
NITRITE UR QL STRIP: NEGATIVE
NT-PROBNP SERPL IA-MCNC: 62.7 PG/ML (ref 0–124)
OSMOLALITY SERPL CALC.SUM OF ELEC: 288.2 MOSMOL/KG (ref 275–300)
PCO2 TEMP ADJ BLDMV: 22 MMHG (ref 41–51)
PCO2 TEMP ADJ BLDMV: 35 MMHG (ref 41–51)
PH BLDMV: 7.3 [PH] (ref 7.31–7.41)
PH BLDMV: 7.35 [PH] (ref 7.31–7.41)
PH BLDV: 7.38 [PH] (ref 7.31–7.41)
PH UR STRIP.AUTO: 5 [PH] (ref 5–9)
PHOSPHATE SERPL-MCNC: 1.2 MG/DL (ref 2.4–4.7)
PHOSPHATE SERPL-MCNC: 2.2 MG/DL (ref 2.4–4.7)
PHOSPHATE SERPL-MCNC: 2.4 MG/DL (ref 2.4–4.7)
PHOSPHATE SERPL-MCNC: 2.5 MG/DL (ref 2.4–4.7)
PHOSPHATE SERPL-MCNC: 3.4 MG/DL (ref 2.4–4.7)
PHOSPHATE SERPL-MCNC: 3.5 MG/DL (ref 2.4–4.7)
PO2 BLDMV: 59 MMHG (ref 25–40)
PO2 BLDMV: 80 MMHG (ref 25–40)
POTASSIUM SERPL-SCNC: 3.5 MEQ/L (ref 3.5–5.2)
POTASSIUM SERPL-SCNC: 3.7 MEQ/L (ref 3.5–5.2)
POTASSIUM SERPL-SCNC: 3.9 MEQ/L (ref 3.5–5.2)
POTASSIUM SERPL-SCNC: 4 MEQ/L (ref 3.5–5.2)
POTASSIUM SERPL-SCNC: 4.1 MEQ/L (ref 3.5–5.2)
PROT UR STRIP.AUTO-MCNC: NEGATIVE MG/DL
SAO2 % BLDMV: 88 %
SAO2 % BLDMV: 95 %
SODIUM SERPL-SCNC: 133 MEQ/L (ref 135–145)
SODIUM SERPL-SCNC: 136 MEQ/L (ref 135–145)
SODIUM SERPL-SCNC: 137 MEQ/L (ref 135–145)
SODIUM SERPL-SCNC: 138 MEQ/L (ref 135–145)
SODIUM SERPL-SCNC: 140 MEQ/L (ref 135–145)
SP GR UR REFRACT.AUTO: > 1.03 (ref 1–1.03)
TRIGL SERPL-MCNC: 275 MG/DL (ref 0–199)
TROPONIN T: < 0.01 NG/ML
UROBILINOGEN, URINE: 0.2 EU/DL (ref 0–1)
WBC #/AREA URNS HPF: NEGATIVE /[HPF]

## 2023-05-26 PROCEDURE — 83605 ASSAY OF LACTIC ACID: CPT

## 2023-05-26 PROCEDURE — 93306 TTE W/DOPPLER COMPLETE: CPT

## 2023-05-26 PROCEDURE — 82948 REAGENT STRIP/BLOOD GLUCOSE: CPT

## 2023-05-26 PROCEDURE — 2580000003 HC RX 258

## 2023-05-26 PROCEDURE — 6370000000 HC RX 637 (ALT 250 FOR IP): Performed by: STUDENT IN AN ORGANIZED HEALTH CARE EDUCATION/TRAINING PROGRAM

## 2023-05-26 PROCEDURE — 81003 URINALYSIS AUTO W/O SCOPE: CPT

## 2023-05-26 PROCEDURE — 2580000003 HC RX 258: Performed by: EMERGENCY MEDICINE

## 2023-05-26 PROCEDURE — 6360000002 HC RX W HCPCS: Performed by: EMERGENCY MEDICINE

## 2023-05-26 PROCEDURE — 96374 THER/PROPH/DIAG INJ IV PUSH: CPT

## 2023-05-26 PROCEDURE — 2580000003 HC RX 258: Performed by: PSYCHIATRY & NEUROLOGY

## 2023-05-26 PROCEDURE — 2500000003 HC RX 250 WO HCPCS

## 2023-05-26 PROCEDURE — 82800 BLOOD PH: CPT

## 2023-05-26 PROCEDURE — 83880 ASSAY OF NATRIURETIC PEPTIDE: CPT

## 2023-05-26 PROCEDURE — 82330 ASSAY OF CALCIUM: CPT

## 2023-05-26 PROCEDURE — 6360000002 HC RX W HCPCS

## 2023-05-26 PROCEDURE — 84100 ASSAY OF PHOSPHORUS: CPT

## 2023-05-26 PROCEDURE — 99223 1ST HOSP IP/OBS HIGH 75: CPT

## 2023-05-26 PROCEDURE — 82550 ASSAY OF CK (CPK): CPT

## 2023-05-26 PROCEDURE — 6370000000 HC RX 637 (ALT 250 FOR IP): Performed by: EMERGENCY MEDICINE

## 2023-05-26 PROCEDURE — 83735 ASSAY OF MAGNESIUM: CPT

## 2023-05-26 PROCEDURE — 82803 BLOOD GASES ANY COMBINATION: CPT

## 2023-05-26 PROCEDURE — 6370000000 HC RX 637 (ALT 250 FOR IP)

## 2023-05-26 PROCEDURE — A4216 STERILE WATER/SALINE, 10 ML: HCPCS

## 2023-05-26 PROCEDURE — 80061 LIPID PANEL: CPT

## 2023-05-26 PROCEDURE — 2500000003 HC RX 250 WO HCPCS: Performed by: PSYCHIATRY & NEUROLOGY

## 2023-05-26 PROCEDURE — 84484 ASSAY OF TROPONIN QUANT: CPT

## 2023-05-26 PROCEDURE — 83036 HEMOGLOBIN GLYCOSYLATED A1C: CPT

## 2023-05-26 PROCEDURE — 6360000002 HC RX W HCPCS: Performed by: PSYCHIATRY & NEUROLOGY

## 2023-05-26 PROCEDURE — 2060000000 HC ICU INTERMEDIATE R&B

## 2023-05-26 PROCEDURE — 36415 COLL VENOUS BLD VENIPUNCTURE: CPT

## 2023-05-26 PROCEDURE — 80048 BASIC METABOLIC PNL TOTAL CA: CPT

## 2023-05-26 RX ORDER — ENOXAPARIN SODIUM 100 MG/ML
40 INJECTION SUBCUTANEOUS DAILY
Status: DISCONTINUED | OUTPATIENT
Start: 2023-05-26 | End: 2023-05-27 | Stop reason: HOSPADM

## 2023-05-26 RX ORDER — 0.9 % SODIUM CHLORIDE 0.9 %
15 INTRAVENOUS SOLUTION INTRAVENOUS ONCE
Status: COMPLETED | OUTPATIENT
Start: 2023-05-26 | End: 2023-05-26

## 2023-05-26 RX ORDER — SODIUM CHLORIDE, SODIUM LACTATE, POTASSIUM CHLORIDE, AND CALCIUM CHLORIDE .6; .31; .03; .02 G/100ML; G/100ML; G/100ML; G/100ML
1000 INJECTION, SOLUTION INTRAVENOUS ONCE
Status: COMPLETED | OUTPATIENT
Start: 2023-05-26 | End: 2023-05-26

## 2023-05-26 RX ORDER — CALCIUM GLUCONATE 20 MG/ML
2000 INJECTION, SOLUTION INTRAVENOUS ONCE
Status: COMPLETED | OUTPATIENT
Start: 2023-05-26 | End: 2023-05-26

## 2023-05-26 RX ORDER — SODIUM CHLORIDE 450 MG/100ML
INJECTION, SOLUTION INTRAVENOUS CONTINUOUS
Status: DISCONTINUED | OUTPATIENT
Start: 2023-05-26 | End: 2023-05-26

## 2023-05-26 RX ORDER — DEXTROSE, SODIUM CHLORIDE, AND POTASSIUM CHLORIDE 5; .45; .15 G/100ML; G/100ML; G/100ML
INJECTION INTRAVENOUS CONTINUOUS PRN
Status: DISCONTINUED | OUTPATIENT
Start: 2023-05-26 | End: 2023-05-27

## 2023-05-26 RX ORDER — LIDOCAINE 4 G/G
1 PATCH TOPICAL DAILY
Status: DISCONTINUED | OUTPATIENT
Start: 2023-05-26 | End: 2023-05-27 | Stop reason: HOSPADM

## 2023-05-26 RX ORDER — POLYETHYLENE GLYCOL 3350 17 G/17G
17 POWDER, FOR SOLUTION ORAL DAILY PRN
Status: DISCONTINUED | OUTPATIENT
Start: 2023-05-26 | End: 2023-05-27 | Stop reason: HOSPADM

## 2023-05-26 RX ORDER — SODIUM CHLORIDE 450 MG/100ML
INJECTION, SOLUTION INTRAVENOUS CONTINUOUS
Status: DISCONTINUED | OUTPATIENT
Start: 2023-05-26 | End: 2023-05-27

## 2023-05-26 RX ORDER — DEXTROSE MONOHYDRATE 100 MG/ML
INJECTION, SOLUTION INTRAVENOUS CONTINUOUS PRN
Status: DISCONTINUED | OUTPATIENT
Start: 2023-05-26 | End: 2023-05-26

## 2023-05-26 RX ORDER — MAGNESIUM SULFATE 1 G/100ML
1000 INJECTION INTRAVENOUS PRN
Status: DISCONTINUED | OUTPATIENT
Start: 2023-05-26 | End: 2023-05-26

## 2023-05-26 RX ORDER — POTASSIUM CHLORIDE 7.45 MG/ML
10 INJECTION INTRAVENOUS PRN
Status: DISCONTINUED | OUTPATIENT
Start: 2023-05-26 | End: 2023-05-27 | Stop reason: HOSPADM

## 2023-05-26 RX ORDER — DEXTROSE AND SODIUM CHLORIDE 5; .45 G/100ML; G/100ML
INJECTION, SOLUTION INTRAVENOUS CONTINUOUS PRN
Status: DISCONTINUED | OUTPATIENT
Start: 2023-05-26 | End: 2023-05-26

## 2023-05-26 RX ORDER — ONDANSETRON 2 MG/ML
4 INJECTION INTRAMUSCULAR; INTRAVENOUS ONCE
Status: COMPLETED | OUTPATIENT
Start: 2023-05-26 | End: 2023-05-26

## 2023-05-26 RX ORDER — METOPROLOL SUCCINATE 25 MG/1
25 TABLET, EXTENDED RELEASE ORAL DAILY
Status: DISCONTINUED | OUTPATIENT
Start: 2023-05-26 | End: 2023-05-27 | Stop reason: HOSPADM

## 2023-05-26 RX ORDER — POTASSIUM CHLORIDE 7.45 MG/ML
10 INJECTION INTRAVENOUS PRN
Status: DISCONTINUED | OUTPATIENT
Start: 2023-05-26 | End: 2023-05-26

## 2023-05-26 RX ORDER — MAGNESIUM SULFATE 1 G/100ML
1000 INJECTION INTRAVENOUS PRN
Status: DISCONTINUED | OUTPATIENT
Start: 2023-05-26 | End: 2023-05-27 | Stop reason: HOSPADM

## 2023-05-26 RX ADMIN — CALCIUM GLUCONATE 2000 MG: 20 INJECTION, SOLUTION INTRAVENOUS at 09:18

## 2023-05-26 RX ADMIN — POTASSIUM CHLORIDE, DEXTROSE MONOHYDRATE AND SODIUM CHLORIDE: 150; 5; 450 INJECTION, SOLUTION INTRAVENOUS at 18:00

## 2023-05-26 RX ADMIN — Medication 16 G: at 05:37

## 2023-05-26 RX ADMIN — SODIUM CHLORIDE 1224 ML: 9 INJECTION, SOLUTION INTRAVENOUS at 04:40

## 2023-05-26 RX ADMIN — DEXTROSE MONOHYDRATE 250 ML: 100 INJECTION, SOLUTION INTRAVENOUS at 03:26

## 2023-05-26 RX ADMIN — FAMOTIDINE 20 MG: 10 INJECTION, SOLUTION INTRAVENOUS at 19:44

## 2023-05-26 RX ADMIN — SODIUM PHOSPHATE, MONOBASIC, MONOHYDRATE AND SODIUM PHOSPHATE, DIBASIC, ANHYDROUS 10 MMOL: 142; 276 INJECTION, SOLUTION INTRAVENOUS at 08:12

## 2023-05-26 RX ADMIN — POTASSIUM CHLORIDE 10 MEQ: 7.46 INJECTION, SOLUTION INTRAVENOUS at 15:37

## 2023-05-26 RX ADMIN — POTASSIUM CHLORIDE 10 MEQ: 7.46 INJECTION, SOLUTION INTRAVENOUS at 17:13

## 2023-05-26 RX ADMIN — POTASSIUM CHLORIDE 10 MEQ: 7.46 INJECTION, SOLUTION INTRAVENOUS at 19:15

## 2023-05-26 RX ADMIN — SODIUM CHLORIDE: 4.5 INJECTION, SOLUTION INTRAVENOUS at 08:10

## 2023-05-26 RX ADMIN — FAMOTIDINE 20 MG: 10 INJECTION, SOLUTION INTRAVENOUS at 05:04

## 2023-05-26 RX ADMIN — ENOXAPARIN SODIUM 40 MG: 100 INJECTION SUBCUTANEOUS at 09:18

## 2023-05-26 RX ADMIN — POTASSIUM CHLORIDE, DEXTROSE MONOHYDRATE AND SODIUM CHLORIDE: 150; 5; 450 INJECTION, SOLUTION INTRAVENOUS at 11:12

## 2023-05-26 RX ADMIN — Medication 1.06 UNITS/HR: at 04:14

## 2023-05-26 RX ADMIN — FAMOTIDINE 20 MG: 10 INJECTION, SOLUTION INTRAVENOUS at 09:18

## 2023-05-26 RX ADMIN — Medication 16 G: at 06:01

## 2023-05-26 RX ADMIN — DEXTROSE AND SODIUM CHLORIDE: 5; 450 INJECTION, SOLUTION INTRAVENOUS at 04:01

## 2023-05-26 RX ADMIN — METOPROLOL SUCCINATE 25 MG: 25 TABLET, EXTENDED RELEASE ORAL at 11:40

## 2023-05-26 RX ADMIN — ALUMINUM HYDROXIDE, MAGNESIUM HYDROXIDE, AND SIMETHICONE: 200; 200; 20 SUSPENSION ORAL at 05:04

## 2023-05-26 RX ADMIN — ONDANSETRON 4 MG: 2 INJECTION INTRAMUSCULAR; INTRAVENOUS at 00:41

## 2023-05-26 RX ADMIN — SODIUM CHLORIDE, POTASSIUM CHLORIDE, SODIUM LACTATE AND CALCIUM CHLORIDE 1000 ML: 600; 310; 30; 20 INJECTION, SOLUTION INTRAVENOUS at 00:41

## 2023-05-26 ASSESSMENT — PAIN SCALES - GENERAL: PAINLEVEL_OUTOF10: 0

## 2023-05-26 NOTE — ED PROVIDER NOTES
Tenderness: There is no abdominal tenderness. There is no guarding or rebound. Musculoskeletal:      Cervical back: Neck supple. Right lower leg: No tenderness. No edema. Left lower leg: No tenderness. No edema. Skin:     General: Skin is warm and dry. Capillary Refill: Capillary refill takes less than 2 seconds. Coloration: Skin is pale. Neurological:      General: No focal deficit present. Mental Status: He is alert and oriented to person, place, and time. FORMAL DIAGNOSTIC RESULTS     RADIOLOGY: Interpretation per the Radiologist below, if available at the time of this note (none if blank):    XR CHEST PORTABLE   Final Result   Impression:   No acute cardiopulmonary disease.       This document has been electronically signed by: Gustavo Enciso MD on    05/26/2023 12:24 AM          LABS: (none if blank)  Labs Reviewed   BASIC METABOLIC PANEL W/ REFLEX TO MG FOR LOW K - Abnormal; Notable for the following components:       Result Value    Sodium 133 (*)     Chloride 89 (*)     CO2 11 (*)     Glucose 483 (*)     All other components within normal limits   CBC WITH AUTO DIFFERENTIAL - Abnormal; Notable for the following components:    WBC 12.9 (*)     MCV 95.4 (*)     MCHC 31.9 (*)     Segs Absolute 9.5 (*)     All other components within normal limits   BLOOD GAS, VENOUS - Abnormal; Notable for the following components:    PH MIXED 7.30 (*)     PCO2, MIXED VENOUS 22 (*)     PO2, Mixed 59 (*)     HCO3, Mixed 11 (*)     Base Exc, Mixed -13.3 (*)     All other components within normal limits   ANION GAP - Abnormal; Notable for the following components:    Anion Gap 33.0 (*)     All other components within normal limits   POCT GLUCOSE - Abnormal; Notable for the following components:    POC Glucose 502 (*)     All other components within normal limits   POCT GLUCOSE - Normal   TROPONIN   GLOMERULAR FILTRATION RATE, ESTIMATED   OSMOLALITY   BETA-HYDROXYBUTYRATE   URINALYSIS WITH REFLEX

## 2023-05-26 NOTE — CARE COORDINATION
5/26/23, 2:21 PM EDT      DISCHARGE PLANNING EVALUATION    Elli Meredith  Admitted: 5/25/2023  Hospital Day: 0    Location: -21/021-A Reason for admit: DKA, type 1, not at goal [facility]) [E10.10]  Diabetic ketoacidosis without coma associated with type 1 diabetes mellitus ([facility] [address] [E10.10]    Past Medical History:   Diagnosis Date    Attention deficit hyperactivity disorder (ADHD), predominantly inattentive type 10/15/2019    Hydronephrosis     Type 1 diabetes mellitus without complication ([facility] [address] 1/97/5161     Barriers to Discharge:   DKA  History: DM, ADHD  Insulin gtt, IV PPI  ECHO planned today    PCP: JEWELL Nieto CNP    Readmission Risk Low 0-14, Mod 15-19), High > 20: Readmission Risk Score: 7.7      Advance Directives:      Code Status: Full Code   Patient's Primary Decision Maker is:        Patient Goals/Plan/Treatment Preferences: plans home w SO [place] v mother Relee Johnston    Transportation/Food Security/Housekeeping Addressed: No issues identified. 5/26/23, 2:22 PM EDT    Patient goals/plan/ treatment preferences discussed by  and . Patient goals/plan/ treatment preferences reviewed with patient/ family. Patient/ family verbalize understanding of discharge plan and are in agreement with goal/plan/treatment preferences. Understanding was demonstrated using the teach back method. AVS provided by RN at time of discharge, which includes all necessary medical information pertaining to the patients current course of illness, treatment, post-discharge goals of care, and treatment preferences.      Services At/After Discharge: None

## 2023-05-26 NOTE — ED NOTES
Patient resting in bed. Respirations easy and unlabored. No distress noted. Call light within reach.   Pt medicated per mar  IVF started, UA collected, pt ambulated to BR, stable steady gait noted      Jaron Alva RN  05/26/23 3393

## 2023-05-26 NOTE — ED NOTES
ED nurse-to-nurse bedside report    Chief Complaint   Patient presents with    Chest Pain    Shortness of Breath      LOC: alert and orientated to name, place, date  Vital signs   Vitals:    05/25/23 2330 05/26/23 0027   BP: (!) 144/75 114/71   Pulse: (!) 112 (!) 107   Resp: 19 19   Temp: 98.6 °F (37 °C)    TempSrc: Oral    SpO2: 100%    Weight: 180 lb (81.6 kg)       Pain:  0  Pain Interventions: mar  Pain Goal: 0  Oxygen: No    Current needs required RA   Telemetry: Yes  LDAs:   Peripheral IV 08/19/20 Left Antecubital (Active)       Peripheral IV 05/25/23 Distal;Left; Anterior Cephalic (Active)   Site Assessment Clean, dry & intact 05/25/23 2335     Continuous Infusions:   Mobility: Independent  Marsh Fall Risk Score: No flowsheet data found.   [facility]   Report given to:         Steve Nuno RN  05/26/23 8760

## 2023-05-26 NOTE — H&P
apply route daily 12/16/22   Ryan Wang MD   FREESTYLE LITE strip use 1 TEST STRIP to TEST BLOOD SUGAR UP TO 7 TIMES A DAY. 12/6/22   Du Cox MD   ibuprofen (ADVIL;MOTRIN) 200 MG tablet Take 1 tablet by mouth as needed    Historical Provider, MD   blood glucose test strips (FREESTYLE LITE) strip Check blood sugars 4x/day 10/19/22   Ryan Wang MD   Saline (NASOGEL) GEL Apply intranasally as needed 8/29/22   JEWELL Grossman CNP       Medical History      Diagnosis Date    Attention deficit hyperactivity disorder (ADHD), predominantly inattentive type 10/15/2019    Hydronephrosis     Type 1 diabetes mellitus without complication ([facility] [address] 9/02/4344       Surgical History  History reviewed. No pertinent surgical history. Tobacco use:   reports that he has never smoked. He has never used smokeless tobacco.  Alcohol use:   reports that he does not currently use alcohol. Drug use:  reports that he does not currently use drugs. Family Hx  History reviewed. No pertinent family history. Diet: Diet NPO  DVT prophylaxis: Lovenox  Code Status: Full Code  Disposition: admit to stepdown     Thank you JEWELL Grossman CNP for the opportunity to be involved in this patient's care.     Electronically signed by JEWELL Nunez CNP on 5/26/2023 at 1:07 AM.

## 2023-05-26 NOTE — PROGRESS NOTES
This Virtual RN completed admission requirements for patient at bedside; this nurse educated patient to use call light to report any change in condition to bedside nursing staff. Patient stated he brought in his insulin and bedside nurse was present in room during admission and is aware of medication being at bedside at this time.

## 2023-05-26 NOTE — ED TRIAGE NOTES
Pt arrives to ED from home with c/o sob and chest pain, pt reports being diabetic, took 36 units of insulin around 830pm  Pt reports nausea and emesis prior to arrival, feels tired and weak  Poct glucose 502 on arrival

## 2023-05-26 NOTE — PROGRESS NOTES
Physician Progress Note      PATIENT:               Madisyn Pearl  CSN #:                  [id number]  :                       2000  ADMIT DATE:       2023 11:24 PM  [address] [place] DATE:  RESPONDING  PROVIDER #:        Yuliana Correa MD          QUERY TEXT:    Patient admitted with DKA and noted to have P > 100 and WBC 12.9. If   possible, please document in progress notes and discharge summary if you are   evaluating and/or treating any of the following: The medical record reflects the following:  Risk Factors: DKA  Clinical Indicators: P > 100 and WBC 12.9  Treatment: insulin drip, IVF, labs    Thank you! Joe Mars RN, BSN, RHIT, CCDS  Clinical   Options provided:  -- Non-infectious SIRS without acute organ dysfunction  -- Other - I will add my own diagnosis  -- Disagree - Not applicable / Not valid  -- Disagree - Clinically unable to determine / Unknown  -- Refer to Clinical Documentation Reviewer    PROVIDER RESPONSE TEXT:    This patient has non-infectious SIRS without acute organ dysfunction. Query created by: Ulises Alva on 2023 10:43 AM      Electronically signed by:   Yuliana Correa MD 2023 10:57 AM

## 2023-05-26 NOTE — ED NOTES
PT and VS assessed. Admitting at bedside. Call light in reach. Family at bedside.       Becka Guzman RN  05/26/23 3711

## 2023-05-27 VITALS
SYSTOLIC BLOOD PRESSURE: 113 MMHG | OXYGEN SATURATION: 97 % | HEART RATE: 77 BPM | TEMPERATURE: 97.3 F | DIASTOLIC BLOOD PRESSURE: 67 MMHG | RESPIRATION RATE: 16 BRPM | WEIGHT: 186.95 LBS | BODY MASS INDEX: 28.33 KG/M2 | HEIGHT: 68 IN

## 2023-05-27 PROBLEM — R07.9 CHEST PAIN: Status: ACTIVE | Noted: 2023-05-27

## 2023-05-27 LAB
ANION GAP SERPL CALC-SCNC: 13 MEQ/L (ref 8–16)
ANION GAP SERPL CALC-SCNC: 15 MEQ/L (ref 8–16)
ANION GAP SERPL CALC-SCNC: 16 MEQ/L (ref 8–16)
ANION GAP SERPL CALC-SCNC: 19 MEQ/L (ref 8–16)
BASOPHILS ABSOLUTE: 0.1 THOU/MM3 (ref 0–0.1)
BASOPHILS NFR BLD AUTO: 1.3 %
BUN SERPL-MCNC: 4 MG/DL (ref 7–22)
BUN SERPL-MCNC: 6 MG/DL (ref 7–22)
CALCIUM SERPL-MCNC: 8.2 MG/DL (ref 8.5–10.5)
CALCIUM SERPL-MCNC: 8.9 MG/DL (ref 8.5–10.5)
CALCIUM SERPL-MCNC: 8.9 MG/DL (ref 8.5–10.5)
CALCIUM SERPL-MCNC: 9 MG/DL (ref 8.5–10.5)
CHLORIDE SERPL-SCNC: 101 MEQ/L (ref 98–111)
CHLORIDE SERPL-SCNC: 101 MEQ/L (ref 98–111)
CHLORIDE SERPL-SCNC: 104 MEQ/L (ref 98–111)
CHLORIDE SERPL-SCNC: 104 MEQ/L (ref 98–111)
CO2 SERPL-SCNC: 20 MEQ/L (ref 23–33)
CO2 SERPL-SCNC: 21 MEQ/L (ref 23–33)
CO2 SERPL-SCNC: 22 MEQ/L (ref 23–33)
CO2 SERPL-SCNC: 24 MEQ/L (ref 23–33)
CREAT SERPL-MCNC: 0.6 MG/DL (ref 0.4–1.2)
CREAT SERPL-MCNC: 0.6 MG/DL (ref 0.4–1.2)
CREAT SERPL-MCNC: 0.7 MG/DL (ref 0.4–1.2)
CREAT SERPL-MCNC: 0.8 MG/DL (ref 0.4–1.2)
DEPRECATED RDW RBC AUTO: 45.4 FL (ref 35–45)
EKG ATRIAL RATE: 115 BPM
EKG P AXIS: 48 DEGREES
EKG P-R INTERVAL: 132 MS
EKG Q-T INTERVAL: 342 MS
EKG QRS DURATION: 98 MS
EKG QTC CALCULATION (BAZETT): 473 MS
EKG R AXIS: 17 DEGREES
EKG T AXIS: 25 DEGREES
EKG VENTRICULAR RATE: 115 BPM
EOSINOPHIL NFR BLD AUTO: 0.4 %
EOSINOPHILS ABSOLUTE: 0 THOU/MM3 (ref 0–0.4)
ERYTHROCYTE [DISTWIDTH] IN BLOOD BY AUTOMATED COUNT: 13.1 % (ref 11.5–14.5)
GFR SERPL CREATININE-BSD FRML MDRD: > 60 ML/MIN/1.73M2
GLUCOSE BLD STRIP.AUTO-MCNC: 107 MG/DL (ref 70–108)
GLUCOSE BLD STRIP.AUTO-MCNC: 158 MG/DL (ref 70–108)
GLUCOSE BLD STRIP.AUTO-MCNC: 164 MG/DL (ref 70–108)
GLUCOSE BLD STRIP.AUTO-MCNC: 170 MG/DL (ref 70–108)
GLUCOSE BLD STRIP.AUTO-MCNC: 172 MG/DL (ref 70–108)
GLUCOSE BLD STRIP.AUTO-MCNC: 192 MG/DL (ref 70–108)
GLUCOSE BLD STRIP.AUTO-MCNC: 195 MG/DL (ref 70–108)
GLUCOSE BLD STRIP.AUTO-MCNC: 224 MG/DL (ref 70–108)
GLUCOSE BLD STRIP.AUTO-MCNC: 249 MG/DL (ref 70–108)
GLUCOSE BLD STRIP.AUTO-MCNC: 274 MG/DL (ref 70–108)
GLUCOSE SERPL-MCNC: 105 MG/DL (ref 70–108)
GLUCOSE SERPL-MCNC: 120 MG/DL (ref 70–108)
GLUCOSE SERPL-MCNC: 188 MG/DL (ref 70–108)
GLUCOSE SERPL-MCNC: 319 MG/DL (ref 70–108)
HCT VFR BLD AUTO: 43 % (ref 42–52)
HGB BLD-MCNC: 14 GM/DL (ref 14–18)
IMM GRANULOCYTES # BLD AUTO: 0.03 THOU/MM3 (ref 0–0.07)
IMM GRANULOCYTES NFR BLD AUTO: 0.4 %
LYMPHOCYTES ABSOLUTE: 2.6 THOU/MM3 (ref 1–4.8)
LYMPHOCYTES NFR BLD AUTO: 36.7 %
MAGNESIUM SERPL-MCNC: 1.6 MG/DL (ref 1.6–2.4)
MAGNESIUM SERPL-MCNC: 1.7 MG/DL (ref 1.6–2.4)
MAGNESIUM SERPL-MCNC: 1.7 MG/DL (ref 1.6–2.4)
MAGNESIUM SERPL-MCNC: 1.9 MG/DL (ref 1.6–2.4)
MCH RBC QN AUTO: 30.8 PG (ref 26–33)
MCHC RBC AUTO-ENTMCNC: 32.6 GM/DL (ref 32.2–35.5)
MCV RBC AUTO: 94.5 FL (ref 80–94)
MONOCYTES ABSOLUTE: 0.3 THOU/MM3 (ref 0.4–1.3)
MONOCYTES NFR BLD AUTO: 4.9 %
NEUTROPHILS NFR BLD AUTO: 56.3 %
NRBC BLD AUTO-RTO: 0 /100 WBC
PHOSPHATE SERPL-MCNC: 2.3 MG/DL (ref 2.4–4.7)
PHOSPHATE SERPL-MCNC: 2.6 MG/DL (ref 2.4–4.7)
PHOSPHATE SERPL-MCNC: 2.6 MG/DL (ref 2.4–4.7)
PHOSPHATE SERPL-MCNC: 2.9 MG/DL (ref 2.4–4.7)
PLATELET # BLD AUTO: 290 THOU/MM3 (ref 130–400)
PMV BLD AUTO: 9.6 FL (ref 9.4–12.4)
POTASSIUM SERPL-SCNC: 3.9 MEQ/L (ref 3.5–5.2)
POTASSIUM SERPL-SCNC: 4 MEQ/L (ref 3.5–5.2)
POTASSIUM SERPL-SCNC: 4.2 MEQ/L (ref 3.5–5.2)
POTASSIUM SERPL-SCNC: 4.3 MEQ/L (ref 3.5–5.2)
RBC # BLD AUTO: 4.55 MILL/MM3 (ref 4.7–6.1)
SEGMENTED NEUTROPHILS ABSOLUTE COUNT: 4 THOU/MM3 (ref 1.8–7.7)
SODIUM SERPL-SCNC: 136 MEQ/L (ref 135–145)
SODIUM SERPL-SCNC: 139 MEQ/L (ref 135–145)
SODIUM SERPL-SCNC: 141 MEQ/L (ref 135–145)
SODIUM SERPL-SCNC: 144 MEQ/L (ref 135–145)
WBC # BLD AUTO: 7.1 THOU/MM3 (ref 4.8–10.8)

## 2023-05-27 PROCEDURE — 6370000000 HC RX 637 (ALT 250 FOR IP): Performed by: STUDENT IN AN ORGANIZED HEALTH CARE EDUCATION/TRAINING PROGRAM

## 2023-05-27 PROCEDURE — 80048 BASIC METABOLIC PNL TOTAL CA: CPT

## 2023-05-27 PROCEDURE — A4216 STERILE WATER/SALINE, 10 ML: HCPCS

## 2023-05-27 PROCEDURE — 93010 ELECTROCARDIOGRAM REPORT: CPT | Performed by: INTERNAL MEDICINE

## 2023-05-27 PROCEDURE — 6360000002 HC RX W HCPCS

## 2023-05-27 PROCEDURE — 85025 COMPLETE CBC W/AUTO DIFF WBC: CPT

## 2023-05-27 PROCEDURE — 82948 REAGENT STRIP/BLOOD GLUCOSE: CPT

## 2023-05-27 PROCEDURE — 2500000003 HC RX 250 WO HCPCS

## 2023-05-27 PROCEDURE — 6370000000 HC RX 637 (ALT 250 FOR IP): Performed by: PSYCHIATRY & NEUROLOGY

## 2023-05-27 PROCEDURE — 84100 ASSAY OF PHOSPHORUS: CPT

## 2023-05-27 PROCEDURE — 83735 ASSAY OF MAGNESIUM: CPT

## 2023-05-27 PROCEDURE — 2580000003 HC RX 258

## 2023-05-27 PROCEDURE — 36415 COLL VENOUS BLD VENIPUNCTURE: CPT

## 2023-05-27 PROCEDURE — 99223 1ST HOSP IP/OBS HIGH 75: CPT | Performed by: INTERNAL MEDICINE

## 2023-05-27 PROCEDURE — 99239 HOSP IP/OBS DSCHRG MGMT >30: CPT | Performed by: STUDENT IN AN ORGANIZED HEALTH CARE EDUCATION/TRAINING PROGRAM

## 2023-05-27 RX ORDER — INSULIN LISPRO 100 [IU]/ML
0-4 INJECTION, SOLUTION INTRAVENOUS; SUBCUTANEOUS
Status: DISCONTINUED | OUTPATIENT
Start: 2023-05-27 | End: 2023-05-27 | Stop reason: HOSPADM

## 2023-05-27 RX ORDER — DEXTROSE MONOHYDRATE 100 MG/ML
INJECTION, SOLUTION INTRAVENOUS CONTINUOUS PRN
Status: DISCONTINUED | OUTPATIENT
Start: 2023-05-27 | End: 2023-05-27 | Stop reason: HOSPADM

## 2023-05-27 RX ORDER — INSULIN GLARGINE 100 [IU]/ML
36 INJECTION, SOLUTION SUBCUTANEOUS ONCE
Status: COMPLETED | OUTPATIENT
Start: 2023-05-27 | End: 2023-05-27

## 2023-05-27 RX ORDER — METOPROLOL SUCCINATE 25 MG/1
25 TABLET, EXTENDED RELEASE ORAL DAILY
Qty: 2 TABLET | Refills: 0 | Status: SHIPPED | OUTPATIENT
Start: 2023-05-28 | End: 2023-05-30

## 2023-05-27 RX ORDER — INSULIN GLARGINE 100 [IU]/ML
36 INJECTION, SOLUTION SUBCUTANEOUS NIGHTLY
Status: DISCONTINUED | OUTPATIENT
Start: 2023-05-27 | End: 2023-05-27 | Stop reason: HOSPADM

## 2023-05-27 RX ADMIN — INSULIN LISPRO 1 UNITS: 100 INJECTION, SOLUTION INTRAVENOUS; SUBCUTANEOUS at 11:41

## 2023-05-27 RX ADMIN — INSULIN GLARGINE 36 UNITS: 100 INJECTION, SOLUTION SUBCUTANEOUS at 05:27

## 2023-05-27 RX ADMIN — ENOXAPARIN SODIUM 40 MG: 100 INJECTION SUBCUTANEOUS at 09:13

## 2023-05-27 RX ADMIN — METOPROLOL SUCCINATE 25 MG: 25 TABLET, EXTENDED RELEASE ORAL at 07:33

## 2023-05-27 RX ADMIN — FAMOTIDINE 20 MG: 10 INJECTION, SOLUTION INTRAVENOUS at 07:30

## 2023-05-27 ASSESSMENT — PAIN SCALES - GENERAL: PAINLEVEL_OUTOF10: 0

## 2023-05-27 NOTE — DISCHARGE SUMMARY
Internal Medicine Resident Discharge Summary      Patient Identification:   Marea Babinski   : 2000  MRN: [id number]   Account: [de-identified]   Patient's PCP: JEWELL Winter CNP    Admit Date: 2023   Discharge Date:   23      Admitting Physician: Ector Schultz MD  Discharge Physician: Paulino Valdivia DO       Discharge Diagnoses:  Diabetic ketoacidosis, resolved. Patient states he has difficulty with adherence due to working third shift. Patient states he missed his long-acting insulin dose 3 times over the past 7 days. Patient had associated symptoms of lightheadedness, SOB, nausea and vomiting. Initial glucose on admission 502. Beta hydroxybutyrate 79.01. A1c 11.2% while last A1c was 8.8% on 2023. Patient follows endocrinologist Jorge Luis Hoang. Patient remains at high risk for readmission due to noncompliance  IDDM 1, controlled  Continue home regimen of 40 Lantus with sliding scale, and glucose checks. Follow-up with endocrinologist  for continuous glucose pump. Complete BMP in 5 days. Typical chest pain, resolved  Heart score: 1, patient states he had multiple episodes of chest pain which was worse on exertion, improved at rest, nonradiating, episodes would last 5 to 60 minutes, occurred for approximately 9 days, nonradiating, describes it as a pressure. Patient has extensive family history of cardiac pathology. Troponin negative, proBNP WNL. Echo displayed EF: 60%, overall left ventricle function is normal.  Cardiologist Dr. Lyn Bhatia recommended CCTA in an outpatient setting. Follow-up with cardiac CTA, take metoprolol day of CT scan. Attention deficit hyperactive disorder  High anion gap metabolic acidosis, resolved      Hospital Course:   Marea Babinski is a 23 y.o. male with PMHx type 1 diabetes and ADHD admitted to [address] [place] [address] on 2023 for chest pain, N/V.         Per HPI \"Patient states that he works night shift at [employer] and when he

## 2023-05-27 NOTE — PROGRESS NOTES
Discharge teaching and instructions for diagnosis/procedure of DKA completed with patient using teachback method. AVS reviewed. Printed prescriptions given to patient. Patient voiced understanding regarding prescriptions, follow up appointments, and care of self at home.  Discharged in a wheelchair to  home with support per family

## 2023-05-27 NOTE — CONSULTS
distress  Mental status - alert, oriented to person, place, and time  Neck - supple, no significant adenopathy, no JVD, or carotid bruits  Chest - clear to auscultation, no wheezes, rales or rhonchi, symmetric air entry  Heart - normal rate, regular rhythm, normal S1, S2, no murmurs, rubs, clicks or gallops  Abdomen - soft, nontender, nondistended, no masses or organomegaly  Neurological - alert, oriented, normal speech, no focal findings or movement disorder noted  Musculoskeletal - no joint tenderness, deformity or swelling  Extremities - peripheral pulses normal, no pedal edema, no clubbing or cyanosis  Skin - normal coloration and turgor, no rashes, no suspicious skin lesions noted      LABS:    Recent Labs     05/25/23  2345 05/26/23  0150 05/26/23  0600   CKTOTAL  --  68  --    TROPONINT < 0.010 < 0.010 < 0.010     CBC:   Lab Results   Component Value Date/Time    WBC 7.1 05/27/2023 05:07 AM    RBC 4.55 05/27/2023 05:07 AM    HGB 14.0 05/27/2023 05:07 AM    HCT 43.0 05/27/2023 05:07 AM    MCV 94.5 05/27/2023 05:07 AM    MCH 30.8 05/27/2023 05:07 AM    MCHC 32.6 05/27/2023 05:07 AM     05/27/2023 05:07 AM    MPV 9.6 05/27/2023 05:07 AM     BMP:    Lab Results   Component Value Date/Time     05/27/2023 09:22 AM    K 3.9 05/27/2023 09:22 AM    K 4.1 05/25/2023 11:45 PM     05/27/2023 09:22 AM    CO2 24 05/27/2023 09:22 AM    BUN 4 05/27/2023 09:22 AM    LABALBU 4.0 01/17/2023 10:14 AM    CREATININE 0.6 05/27/2023 09:22 AM    CALCIUM 9.0 05/27/2023 09:22 AM    LABGLOM >60 05/27/2023 09:22 AM    GLUCOSE 105 05/27/2023 09:22 AM     Hepatic Function Panel:    Lab Results   Component Value Date/Time    ALKPHOS 151 01/17/2023 10:14 AM    ALT 21 01/17/2023 10:14 AM    AST 22 01/17/2023 10:14 AM    PROT 6.8 01/17/2023 10:14 AM    BILITOT 0.3 01/17/2023 10:14 AM    BILIDIR <0.2 01/10/2020 11:46 AM    LABALBU 4.0 01/17/2023 10:14 AM     Magnesium:    Lab Results   Component Value Date/Time    MG 1.7

## 2023-05-27 NOTE — PLAN OF CARE
Name: Zoran Jesus  YOB: 2000  MRN: [id number]  Date: 05/26/23     Plan of Care      Diabetic ketoacidosis in the setting of T1DM. Patient states he has difficulty with adherence due to working third shift. Patient states he missed his long-acting insulin dose 3 times over the past 7 days. Patient had associated symptoms of lightheadedness, SOB, nausea and vomiting. Initial glucose on admission 502. Beta hydroxybutyrate 79.01. A1c 11.2% while last A1c was 8.8% on 1/2023. Patient follows endocrinologist Yelena Domínguez. Continue with BMP, phosphorus, magnesium every 4 hours, half NS, insulin GTT, dextrose 5% and 0.45% saline with KCl 20 mEq infusion. When patient is able to eat, anion gap closed x2, give Lantus 34 units with medium dose sliding scale, Accu-Cheks, hypoglycemic protocol. Discontinue infusions 2 hours after subcu Lantus is administered. Typical chest pain  Heart score: 1, patient states he had multiple episodes of chest pain which was worse on exertion, improved at rest, nonradiating, episodes would last 5 to 60 minutes, occurred for approximately 9 days, nonradiating, describes it as a pressure. Patient has extensive family history of cardiac pathology. Troponin negative, proBNP WNL. Echo completed, not read, consider cardiac CTA pending echo.   Attention deficit hyperactive disorder  Noted, currently not on medication  High anion gap metabolic acidosis, resolved    Case discussed with Anh Hilton MD    Electronically signed by Marco Carranza DO on 5/26/2023 at 4:21 PM
Problem: Discharge Planning  Goal: Discharge to home or other facility with appropriate resources  Outcome: Progressing  Flowsheets (Taken 5/27/2023 0709)  Discharge to home or other facility with appropriate resources:   Identify barriers to discharge with patient and caregiver   Arrange for needed discharge resources and transportation as appropriate   Identify discharge learning needs (meds, wound care, etc)   Refer to discharge planning if patient needs post-hospital services based on physician order or complex needs related to functional status, cognitive ability or social support system     Problem: Safety - Adult  Goal: Free from fall injury  Outcome: Progressing     Problem: Skin/Tissue Integrity  Goal: Absence of new skin breakdown  Description: 1. Monitor for areas of redness and/or skin breakdown  2. Assess vascular access sites hourly  3. Every 4-6 hours minimum:  Change oxygen saturation probe site  4. Every 4-6 hours:  If on nasal continuous positive airway pressure, respiratory therapy assess nares and determine need for appliance change or resting period.   Outcome: Progressing     Problem: Chronic Conditions and Co-morbidities  Goal: Patient's chronic conditions and co-morbidity symptoms are monitored and maintained or improved  Outcome: Progressing  Flowsheets (Taken 5/27/2023 0709)  Care Plan - Patient's Chronic Conditions and Co-Morbidity Symptoms are Monitored and Maintained or Improved:   Monitor and assess patient's chronic conditions and comorbid symptoms for stability, deterioration, or improvement   Collaborate with multidisciplinary team to address chronic and comorbid conditions and prevent exacerbation or deterioration     Problem: Nutrition Deficit:  Goal: Optimize nutritional status  Outcome: Progressing     Problem: Pain  Goal: Verbalizes/displays adequate comfort level or baseline comfort level  Outcome: Progressing     Problem: Metabolic/Fluid and Electrolytes - Adult  Goal:
fair balance

## 2023-05-27 NOTE — DISCHARGE INSTR - DIET
Good nutrition is important when healing from an illness, injury, or surgery. Follow any nutrition recommendations given to you during your hospital stay. If you were given an oral nutrition supplement while in the hospital, continue to take this supplement at home. You can take it with meals, in-between meals, and/or before bedtime. These supplements can be purchased at most local grocery stores, pharmacies, and chain super-stores. If you have any questions about your diet or nutrition, call the hospital and ask for the dietitian. Eating healthy is the first step in controlling diabetes    Here's how to get started. ... Eat 3 meals a day. Eat your meals at the same time each day and do not skip meals. Eat about the same amount of food each day. Limit sugar and sweets. Eat less candy, desserts, pastries and jelly. Limit intake of regular pop, sugary beverages and fruit juice. Drink sugar free beverages such as diet pop, water, Crystal Light, and unsweetened tea instead. Use Equal or Sweet-n-Low in place of sugar. Lose weight if you are overweight. Even a small amount of weight loss may help improve your blood sugar control. To help lose weight, reduce your portion sizes. Control your intake of carbohydrates. Carbohydrate is the main  nutrient that affects blood sugar levels. All the carbohydrate you eat is turned  into sugar by your body. Therefore, it is important to control  the amount  of carbohydrate that you eat a day. You should eat about 60-75  grams of  carbohydrate at each meal.      Common sources of carbohydrates:     ? Fruit and fruit juice  ? Bread, cereal, pasta, rice  ? Starchy vegetables (potatoes, corn, peas)  ? Milk, yogurt, pudding  ? Beans and lentils  ? Desserts, candy, ice cream, doughnuts and pastries      Eat more fiber.   Fiber can help slow down the rise in blood sugar following a meal.  To get more fiber in your diet, eat at least 5 servings of fruits and

## 2023-05-27 NOTE — DISCHARGE INSTRUCTIONS
Followup with PCP within 10 days. Continue taking medications as prescribed. Follow-up with lab work prior to PCP visit. Take metoprolol day before and day of cardiac CT scan for heart rate control. Discuss with PCP for need with follow-up with cardiology after CT scan.

## 2023-05-30 ENCOUNTER — HOSPITAL ENCOUNTER (OUTPATIENT)
Age: 23
Discharge: HOME | End: 2023-05-30
Payer: COMMERCIAL

## 2023-05-30 ENCOUNTER — CARE COORDINATION (OUTPATIENT)
Dept: CASE MANAGEMENT | Age: 23
End: 2023-05-30

## 2023-05-30 DIAGNOSIS — E10.10 DKA, TYPE 1, NOT AT GOAL (HCC): ICD-10-CM

## 2023-05-30 LAB
ANION GAP SERPL CALC-SCNC: 14 MEQ/L (ref 8–16)
BUN SERPL-MCNC: 12 MG/DL (ref 7–22)
CALCIUM SERPL-MCNC: 9.4 MG/DL (ref 8.5–10.5)
CHLORIDE SERPL-SCNC: 99 MEQ/L (ref 98–111)
CO2 SERPL-SCNC: 21 MEQ/L (ref 23–33)
CREAT SERPL-MCNC: 0.7 MG/DL (ref 0.4–1.2)
EKG ATRIAL RATE: 115 BPM
EKG P AXIS: 48 DEGREES
EKG P-R INTERVAL: 132 MS
EKG Q-T INTERVAL: 342 MS
EKG QRS DURATION: 98 MS
EKG QTC CALCULATION (BAZETT): 473 MS
EKG R AXIS: 17 DEGREES
EKG T AXIS: 25 DEGREES
EKG VENTRICULAR RATE: 115 BPM
GFR SERPL CREATININE-BSD FRML MDRD: > 60 ML/MIN/1.73M2
GLUCOSE SERPL-MCNC: 424 MG/DL (ref 70–108)
POTASSIUM SERPL-SCNC: 5.5 MEQ/L (ref 3.5–5.2)
SODIUM SERPL-SCNC: 134 MEQ/L (ref 135–145)

## 2023-05-30 PROCEDURE — 36415 COLL VENOUS BLD VENIPUNCTURE: CPT

## 2023-05-30 PROCEDURE — 80048 BASIC METABOLIC PNL TOTAL CA: CPT

## 2023-05-30 NOTE — CARE COORDINATION
Care Transitions Outreach Attempt    Call within 2 business days of discharge: Yes     Patient: Julieta Salguero Patient : 2000 MRN: <[id number]> Dx: DM 1 w/ DKA Facility: [facility] 23-23    Last Discharge  [address]       Date Complaint Diagnosis Description Type Department Provider    23 Chest Pain; Shortness of Breath Diabetic ketoacidosis without coma associated with type 1 diabetes mellitus ([facility] [address] . .. ED to Hosp-Admission (Discharged) (ADMITTED) DEANNA JohnsK Gabi Muñoz MD; Werner Resendiz. .. Noted following upcoming appointments from discharge chart review:   [facility] follow up appointment(s):   Future Appointments   Date Time Provider Amita Smith   2023  8:40 AM Manjit Groves, APRN - [id number] [address] - [facility]   2023  1:00 PM STR CT IMAGING RM1 [facility] CT SCAN STR Radiolog   6/15/2023  3:00 PM Jj Birch MD AFL APEX AFL APEX END     1st attempt to reach for Care Transition discharge call unsuccessful. HIPAA compliant message left requesting call back. UTR letter sent via My Chart.  [address] [place] [address], [facility] [phone]

## 2023-05-31 ENCOUNTER — TELEPHONE (OUTPATIENT)
Dept: INTERNAL MEDICINE CLINIC | Age: 23
End: 2023-05-31

## 2023-05-31 ENCOUNTER — CARE COORDINATION (OUTPATIENT)
Dept: CASE MANAGEMENT | Age: 23
End: 2023-05-31

## 2023-05-31 ENCOUNTER — OFFICE VISIT (OUTPATIENT)
Dept: FAMILY MEDICINE CLINIC | Age: 23
End: 2023-05-31

## 2023-05-31 VITALS
RESPIRATION RATE: 16 BRPM | HEIGHT: 68 IN | HEART RATE: 115 BPM | DIASTOLIC BLOOD PRESSURE: 62 MMHG | OXYGEN SATURATION: 97 % | BODY MASS INDEX: 27.43 KG/M2 | TEMPERATURE: 98.1 F | SYSTOLIC BLOOD PRESSURE: 118 MMHG | WEIGHT: 181 LBS

## 2023-05-31 DIAGNOSIS — E78.5 DYSLIPIDEMIA DUE TO TYPE 1 DIABETES MELLITUS (HCC): ICD-10-CM

## 2023-05-31 DIAGNOSIS — Z09 HOSPITAL DISCHARGE FOLLOW-UP: ICD-10-CM

## 2023-05-31 DIAGNOSIS — B36.0 TINEA VERSICOLOR: ICD-10-CM

## 2023-05-31 DIAGNOSIS — E10.10 DIABETIC KETOACIDOSIS WITHOUT COMA ASSOCIATED WITH TYPE 1 DIABETES MELLITUS (HCC): Primary | ICD-10-CM

## 2023-05-31 DIAGNOSIS — E10.69 DYSLIPIDEMIA DUE TO TYPE 1 DIABETES MELLITUS (HCC): ICD-10-CM

## 2023-05-31 RX ORDER — ATORVASTATIN CALCIUM 20 MG/1
20 TABLET, FILM COATED ORAL NIGHTLY
Qty: 90 TABLET | Refills: 3 | Status: SHIPPED | OUTPATIENT
Start: 2023-05-31

## 2023-05-31 RX ORDER — KETOCONAZOLE 20 MG/ML
SHAMPOO TOPICAL
Qty: 120 ML | Refills: 1 | Status: SHIPPED | OUTPATIENT
Start: 2023-05-31

## 2023-05-31 SDOH — ECONOMIC STABILITY: FOOD INSECURITY: WITHIN THE PAST 12 MONTHS, YOU WORRIED THAT YOUR FOOD WOULD RUN OUT BEFORE YOU GOT MONEY TO BUY MORE.: NEVER TRUE

## 2023-05-31 SDOH — ECONOMIC STABILITY: INCOME INSECURITY: HOW HARD IS IT FOR YOU TO PAY FOR THE VERY BASICS LIKE FOOD, HOUSING, MEDICAL CARE, AND HEATING?: NOT HARD AT ALL

## 2023-05-31 SDOH — ECONOMIC STABILITY: HOUSING INSECURITY
IN THE LAST 12 MONTHS, WAS THERE A TIME WHEN YOU DID NOT HAVE A STEADY PLACE TO SLEEP OR SLEPT IN A SHELTER (INCLUDING NOW)?: NO

## 2023-05-31 SDOH — ECONOMIC STABILITY: FOOD INSECURITY: WITHIN THE PAST 12 MONTHS, THE FOOD YOU BOUGHT JUST DIDN'T LAST AND YOU DIDN'T HAVE MONEY TO GET MORE.: NEVER TRUE

## 2023-05-31 ASSESSMENT — PATIENT HEALTH QUESTIONNAIRE - PHQ9
1. LITTLE INTEREST OR PLEASURE IN DOING THINGS: 0
SUM OF ALL RESPONSES TO PHQ9 QUESTIONS 1 & 2: 0
SUM OF ALL RESPONSES TO PHQ QUESTIONS 1-9: 0
SUM OF ALL RESPONSES TO PHQ QUESTIONS 1-9: 0
2. FEELING DOWN, DEPRESSED OR HOPELESS: 0
SUM OF ALL RESPONSES TO PHQ QUESTIONS 1-9: 0
SUM OF ALL RESPONSES TO PHQ QUESTIONS 1-9: 0

## 2023-05-31 NOTE — PROGRESS NOTES
Transition of Care Visit/Hospital Follow Up:      Clint Barksdale is a 23 y.o. male that presents for Follow-Up from Hospital    Date of Discharge:   5/27/23  Was patient contacted within 2 business days of discharge (see chart for documentation):  yes - 5/30/23    Patient presents for hospital follow up. Patient recently hospitalized at [facility] for treatment of DKA. Symptoms prior to admission:  CP and vomiting     Hospital Course per DC Summary:    Hospital Course:   Clint Barksdale is a 23 y.o. male with PMHx type 1 diabetes and ADHD admitted to [address] [place] [address] on 5/25/2023 for chest pain, N/V. Per HPI \"Patient states that he works night shift at [employer] and when he woke up this evening at 1700 that he had began to have chest pain and vomiting. Noted his sugars to be high. States that over the last couple of days he has had a dry unproductive cough, accompanied by chest pain and shortness of breath. States that the shortness of breath gets worse when he ambulates. Describes the chest pain as sharp at times, mid sternal and is currently a 4/10 at this time. States his legs hurt, but it is more of an ache at this time that is a 2/10. Denies any known sick contacts. Denies diarrhea. Denies needing extra pillows for sleeping. \"     Patient's discontinued infusions on 5/27, blood glucose has been controlled throughout the day. Patient no longer admits to chest pain, and/V, arm pain, headache. Medication changes at discharge:  Med list reconciled today    Clinical course since discharge:      Patient is home and is doing better. He is working third shift which does mess with his scheduling of meals and taking insulin. Blood sugars running 170-210 range. Denies any polys. Denies any blurry vision or paresthesias. Compliant with taking Novolog and Lantus. Thinking about going on insulin pump. He also stopped taking the Lipitor.     Has rash on his buttocks which has been there for awhile, seems

## 2023-05-31 NOTE — CARE COORDINATION
Care Transitions Outreach Attempt    Call within 2 business days of discharge: Yes   Attempted to reach patient for transitions of care follow up. Unable to reach patient. Day 2 Left HIPAA compliant message w/ CTN # to return call with no response. MyChart letter already sent 23  Episode resolved per protocol. Patient: Roya Levi Patient : 2000 MRN: [id number]    Last Discharge  Street       Date Complaint Diagnosis Description Type Department Provider    23 Chest Pain; Shortness of Breath Diabetic ketoacidosis without coma associated with type 1 diabetes mellitus ([facility] [address] . .. ED to Hosp-Admission (Discharged) (ADMITTED) DEANNA 4K Cristela Mathews MD; Debbie Moody. .. Was this an external facility discharge?  No Discharge Facility:     Noted following upcoming appointments from discharge chart review:   [facility] follow up appointment(s):   Future Appointments   Date Time Provider Amita Smith   2023  1:00 PM STR CT IMAGING RM1 STR CT SCAN STR Radiolog   6/15/2023  3:00 PM Blessing Joaquin MD AFL APEX AFL APEX END     [facility] follow up appointment(s):

## 2023-06-06 ENCOUNTER — HOSPITAL ENCOUNTER (OUTPATIENT)
Dept: CT IMAGING | Age: 23
Discharge: HOME | End: 2023-06-06
Payer: COMMERCIAL

## 2023-06-06 VITALS
DIASTOLIC BLOOD PRESSURE: 63 MMHG | HEART RATE: 75 BPM | SYSTOLIC BLOOD PRESSURE: 108 MMHG | RESPIRATION RATE: 12 BRPM | OXYGEN SATURATION: 97 %

## 2023-06-06 DIAGNOSIS — R07.9 CHEST PAIN, UNSPECIFIED TYPE: ICD-10-CM

## 2023-06-06 PROCEDURE — 75574 CT ANGIO HRT W/3D IMAGE: CPT

## 2023-06-06 PROCEDURE — 6370000000 HC RX 637 (ALT 250 FOR IP): Performed by: RADIOLOGY

## 2023-06-06 PROCEDURE — 2500000003 HC RX 250 WO HCPCS: Performed by: RADIOLOGY

## 2023-06-06 PROCEDURE — 6360000004 HC RX CONTRAST MEDICATION: Performed by: PSYCHIATRY & NEUROLOGY

## 2023-06-06 RX ORDER — METOPROLOL TARTRATE 5 MG/5ML
5 INJECTION INTRAVENOUS EVERY 5 MIN PRN
Status: COMPLETED | OUTPATIENT
Start: 2023-06-06 | End: 2023-06-06

## 2023-06-06 RX ORDER — NITROGLYCERIN 0.4 MG/1
0.4 TABLET SUBLINGUAL ONCE
Status: COMPLETED | OUTPATIENT
Start: 2023-06-06 | End: 2023-06-06

## 2023-06-06 RX ADMIN — METOPROLOL TARTRATE 5 MG: 5 INJECTION INTRAVENOUS at 13:17

## 2023-06-06 RX ADMIN — METOPROLOL TARTRATE 5 MG: 5 INJECTION INTRAVENOUS at 13:22

## 2023-06-06 RX ADMIN — METOPROLOL TARTRATE 5 MG: 5 INJECTION INTRAVENOUS at 13:27

## 2023-06-06 RX ADMIN — NITROGLYCERIN 0.4 MG: 0.4 TABLET, ORALLY DISINTEGRATING SUBLINGUAL at 13:30

## 2023-06-06 RX ADMIN — IOPAMIDOL 100 ML: 755 INJECTION, SOLUTION INTRAVENOUS at 13:10

## 2023-06-06 NOTE — PROGRESS NOTES
1310 Greeted pt in CT scanning for CTA coronary test. Pt states he has been recently discharged from the hospital for DKA and chest pain, which prompted this testing today. IV started by YVETTE Shepherd tech. Vitals as charted. HR 80.      1330 NTG SL 0.4 given prior to CT scan. 1333 Scan complete. Pt tolerated well. 1335 Post procedure vitals taken. INT discontinued; hemostasis achieved. 1338 Pt sitting at bedside of CT table. Pt denies shortness of breath, dizziness, lightheadedness. Pt does not wish to wait the 30 minutes post procedure monitoring. 1340 Pt ambulated to main entrance for discharge.

## 2023-06-07 ENCOUNTER — TELEPHONE (OUTPATIENT)
Dept: FAMILY MEDICINE CLINIC | Age: 23
End: 2023-06-07

## 2023-06-07 NOTE — TELEPHONE ENCOUNTER
----- Message from JEWELL Bentley CNP sent at 6/7/2023  8:57 AM EDT -----  Let Inessajamison Drake know his cardiac testing was normal.

## 2023-08-09 ENCOUNTER — HOSPITAL ENCOUNTER (EMERGENCY)
Age: 23
Discharge: HOME | End: 2023-08-09
Attending: EMERGENCY MEDICINE
Payer: COMMERCIAL

## 2023-08-09 VITALS
DIASTOLIC BLOOD PRESSURE: 73 MMHG | TEMPERATURE: 97.8 F | RESPIRATION RATE: 18 BRPM | HEART RATE: 100 BPM | OXYGEN SATURATION: 97 % | SYSTOLIC BLOOD PRESSURE: 120 MMHG

## 2023-08-09 DIAGNOSIS — E10.65 TYPE 1 DIABETES MELLITUS WITH HYPERGLYCEMIA (HCC): ICD-10-CM

## 2023-08-09 DIAGNOSIS — R73.9 HYPERGLYCEMIA: Primary | ICD-10-CM

## 2023-08-09 LAB
ANION GAP SERPL CALC-SCNC: 19 MEQ/L (ref 8–16)
ANION GAP SERPL CALC-SCNC: 21 MEQ/L (ref 8–16)
B-OH-BUTYR SERPL-MSCNC: 22.47 MG/DL (ref 0.2–2.81)
BACTERIA: ABNORMAL
BASE EXCESS BLDA CALC-SCNC: -0.8 MMOL/L (ref -2–3)
BASOPHILS ABSOLUTE: 0.1 THOU/MM3 (ref 0–0.1)
BASOPHILS NFR BLD AUTO: 0.5 %
BILIRUB UR QL STRIP: NEGATIVE
BUN SERPL-MCNC: 20 MG/DL (ref 7–22)
BUN SERPL-MCNC: 26 MG/DL (ref 7–22)
CALCIUM SERPL-MCNC: 9.2 MG/DL (ref 8.5–10.5)
CALCIUM SERPL-MCNC: 9.8 MG/DL (ref 8.5–10.5)
CASTS #/AREA URNS LPF: ABNORMAL /LPF
CASTS #/AREA URNS LPF: ABNORMAL /LPF
CHARACTER UR: ABNORMAL
CHARCOAL URNS QL MICRO: ABNORMAL
CHLORIDE SERPL-SCNC: 88 MEQ/L (ref 98–111)
CHLORIDE SERPL-SCNC: 99 MEQ/L (ref 98–111)
CO2 SERPL-SCNC: 20 MEQ/L (ref 23–33)
CO2 SERPL-SCNC: 21 MEQ/L (ref 23–33)
COLLECTED BY:: ABNORMAL
COLOR UR: YELLOW
CREAT SERPL-MCNC: 1 MG/DL (ref 0.4–1.2)
CREAT SERPL-MCNC: 1.2 MG/DL (ref 0.4–1.2)
CRYSTALS URNS QL MICRO: ABNORMAL
DEPRECATED RDW RBC AUTO: 42.7 FL (ref 35–45)
EOSINOPHIL NFR BLD AUTO: 0.2 %
EOSINOPHILS ABSOLUTE: 0 THOU/MM3 (ref 0–0.4)
EPITHELIAL CELLS, UA: ABNORMAL /HPF
ERYTHROCYTE [DISTWIDTH] IN BLOOD BY AUTOMATED COUNT: 12.8 % (ref 11.5–14.5)
GFR SERPL CREATININE-BSD FRML MDRD: > 60 ML/MIN/1.73M2
GFR SERPL CREATININE-BSD FRML MDRD: > 60 ML/MIN/1.73M2
GLUCOSE BLD STRIP.AUTO-MCNC: 120 MG/DL (ref 70–108)
GLUCOSE BLD STRIP.AUTO-MCNC: 356 MG/DL (ref 70–108)
GLUCOSE SERPL-MCNC: 122 MG/DL (ref 70–108)
GLUCOSE SERPL-MCNC: 371 MG/DL (ref 70–108)
GLUCOSE UR QL STRIP.AUTO: >= 1000 MG/DL
HCO3 BLDA-SCNC: 23 MMOL/L (ref 23–28)
HCT VFR BLD AUTO: 46.1 % (ref 42–52)
HGB BLD-MCNC: 15.8 GM/DL (ref 14–18)
HGB UR QL STRIP.AUTO: NEGATIVE
IMM GRANULOCYTES # BLD AUTO: 0.04 THOU/MM3 (ref 0–0.07)
IMM GRANULOCYTES NFR BLD AUTO: 0.4 %
KETONES UR QL STRIP.AUTO: 15
LEUKOCYTE ESTERASE UR QL STRIP.AUTO: NEGATIVE
LYMPHOCYTES ABSOLUTE: 2.7 THOU/MM3 (ref 1–4.8)
LYMPHOCYTES NFR BLD AUTO: 24.9 %
MCH RBC QN AUTO: 31.6 PG (ref 26–33)
MCHC RBC AUTO-ENTMCNC: 34.3 GM/DL (ref 32.2–35.5)
MCV RBC AUTO: 92.2 FL (ref 80–94)
MONOCYTES ABSOLUTE: 0.9 THOU/MM3 (ref 0.4–1.3)
MONOCYTES NFR BLD AUTO: 7.9 %
NEUTROPHILS NFR BLD AUTO: 66.1 %
NITRITE UR QL STRIP.AUTO: NEGATIVE
NRBC BLD AUTO-RTO: 0 /100 WBC
OSMOLALITY SERPL CALC.SUM OF ELEC: 279.6 MOSMOL/KG (ref 275–300)
OSMOLALITY SERPL CALC.SUM OF ELEC: 280.7 MOSMOL/KG (ref 275–300)
PCO2 TEMP ADJ BLDMV: 33 MMHG (ref 41–51)
PH BLDMV: 7.44 [PH] (ref 7.31–7.41)
PH UR STRIP.AUTO: 5.5 [PH] (ref 5–9)
PLATELET # BLD AUTO: 407 THOU/MM3 (ref 130–400)
PMV BLD AUTO: 9.7 FL (ref 9.4–12.4)
PO2 BLDMV: 62 MMHG (ref 25–40)
POTASSIUM SERPL-SCNC: 3.3 MEQ/L (ref 3.5–5.2)
POTASSIUM SERPL-SCNC: 3.4 MEQ/L (ref 3.5–5.2)
PROT UR STRIP.AUTO-MCNC: ABNORMAL MG/DL
RBC # BLD AUTO: 5 MILL/MM3 (ref 4.7–6.1)
RBC #/AREA URNS HPF: ABNORMAL /HPF
RENAL EPI CELLS #/AREA URNS HPF: ABNORMAL /[HPF]
SAO2 % BLDMV: 92 %
SEGMENTED NEUTROPHILS ABSOLUTE COUNT: 7.3 THOU/MM3 (ref 1.8–7.7)
SODIUM SERPL-SCNC: 130 MEQ/L (ref 135–145)
SODIUM SERPL-SCNC: 138 MEQ/L (ref 135–145)
SP GR UR REFRACT.AUTO: > 1.03 (ref 1–1.03)
UROBILINOGEN UR QL STRIP.AUTO: 1 EU/DL (ref 0–1)
WBC # BLD AUTO: 11 THOU/MM3 (ref 4.8–10.8)
WBC #/AREA URNS HPF: ABNORMAL /HPF
YEAST LIKE FUNGI URNS QL MICRO: ABNORMAL

## 2023-08-09 PROCEDURE — 82948 REAGENT STRIP/BLOOD GLUCOSE: CPT

## 2023-08-09 PROCEDURE — 82803 BLOOD GASES ANY COMBINATION: CPT

## 2023-08-09 PROCEDURE — 93010 ELECTROCARDIOGRAM REPORT: CPT | Performed by: INTERNAL MEDICINE

## 2023-08-09 PROCEDURE — 82010 KETONE BODYS QUAN: CPT

## 2023-08-09 PROCEDURE — 93005 ELECTROCARDIOGRAM TRACING: CPT | Performed by: EMERGENCY MEDICINE

## 2023-08-09 PROCEDURE — 99284 EMERGENCY DEPT VISIT MOD MDM: CPT

## 2023-08-09 PROCEDURE — 81001 URINALYSIS AUTO W/SCOPE: CPT

## 2023-08-09 PROCEDURE — 85025 COMPLETE CBC W/AUTO DIFF WBC: CPT

## 2023-08-09 PROCEDURE — 6370000000 HC RX 637 (ALT 250 FOR IP): Performed by: EMERGENCY MEDICINE

## 2023-08-09 PROCEDURE — 2580000003 HC RX 258: Performed by: EMERGENCY MEDICINE

## 2023-08-09 PROCEDURE — 96360 HYDRATION IV INFUSION INIT: CPT

## 2023-08-09 PROCEDURE — 80048 BASIC METABOLIC PNL TOTAL CA: CPT

## 2023-08-09 PROCEDURE — 94761 N-INVAS EAR/PLS OXIMETRY MLT: CPT

## 2023-08-09 PROCEDURE — 36415 COLL VENOUS BLD VENIPUNCTURE: CPT

## 2023-08-09 RX ORDER — 0.9 % SODIUM CHLORIDE 0.9 %
1000 INTRAVENOUS SOLUTION INTRAVENOUS ONCE
Status: COMPLETED | OUTPATIENT
Start: 2023-08-09 | End: 2023-08-09

## 2023-08-09 RX ORDER — POTASSIUM CHLORIDE 20 MEQ/1
40 TABLET, EXTENDED RELEASE ORAL ONCE
Status: COMPLETED | OUTPATIENT
Start: 2023-08-09 | End: 2023-08-09

## 2023-08-09 RX ORDER — BLOOD-GLUCOSE METER
KIT MISCELLANEOUS
Qty: 100 EACH | Refills: 3 | Status: SHIPPED | OUTPATIENT
Start: 2023-08-09

## 2023-08-09 RX ORDER — BLOOD-GLUCOSE METER
KIT MISCELLANEOUS
Qty: 100 STRIP | Refills: 3 | Status: SHIPPED | OUTPATIENT
Start: 2023-08-09

## 2023-08-09 RX ADMIN — SODIUM CHLORIDE 1000 ML: 9 INJECTION, SOLUTION INTRAVENOUS at 12:07

## 2023-08-09 RX ADMIN — POTASSIUM CHLORIDE 40 MEQ: 1500 TABLET, EXTENDED RELEASE ORAL at 15:59

## 2023-08-09 ASSESSMENT — PAIN SCALES - GENERAL
PAINLEVEL_OUTOF10: 4
PAINLEVEL_OUTOF10: 0
PAINLEVEL_OUTOF10: 0

## 2023-08-09 ASSESSMENT — PAIN - FUNCTIONAL ASSESSMENT: PAIN_FUNCTIONAL_ASSESSMENT: 0-10

## 2023-08-09 ASSESSMENT — PAIN DESCRIPTION - DESCRIPTORS: DESCRIPTORS: TIGHTNESS

## 2023-08-09 ASSESSMENT — PAIN DESCRIPTION - ORIENTATION: ORIENTATION: LEFT

## 2023-08-09 ASSESSMENT — PAIN DESCRIPTION - LOCATION: LOCATION: CHEST

## 2023-08-09 NOTE — ED NOTES
Presents to ER from Dr. [facility] office with concern of N/V, CP. Pt reports hx of DKA 3 months ago. Pt reports BS this am 130s. EMS reports . Pt reports pain in left upper chest. Reports he is not good with checking his sugars but uses his carb intake to dose insulin.  This RN to monitor      Domingo Mariano RN  08/09/23 9342

## 2023-08-09 NOTE — ED NOTES
Pt resting in bed. Family at bedside. Pt voiced no concerns.  This RN to monitor      Nona Maravilla RN  08/09/23 4207

## 2023-08-09 NOTE — ED NOTES
Pt resting in bed. Reports feeling \"normal at the moment\". Voiced no concerns.  Will monitor      Jesus Shelley RN  08/09/23 5175

## 2023-08-11 LAB
EKG ATRIAL RATE: 113 BPM
EKG P AXIS: 42 DEGREES
EKG P-R INTERVAL: 120 MS
EKG Q-T INTERVAL: 340 MS
EKG QRS DURATION: 96 MS
EKG QTC CALCULATION (BAZETT): 466 MS
EKG R AXIS: 12 DEGREES
EKG T AXIS: 27 DEGREES
EKG VENTRICULAR RATE: 113 BPM

## 2024-04-16 DIAGNOSIS — E10.65 TYPE 1 DIABETES MELLITUS WITH HYPERGLYCEMIA (HCC): ICD-10-CM

## 2024-04-16 RX ORDER — PROCHLORPERAZINE 25 MG/1
SUPPOSITORY RECTAL
Qty: 9 EACH | Refills: 1 | Status: SHIPPED | OUTPATIENT
Start: 2024-04-16

## 2024-04-16 RX ORDER — INSULIN ASPART 100 [IU]/ML
INJECTION, SOLUTION INTRAVENOUS; SUBCUTANEOUS
Qty: 5 ADJUSTABLE DOSE PRE-FILLED PEN SYRINGE | Refills: 1 | Status: SHIPPED | OUTPATIENT
Start: 2024-04-16

## 2024-04-16 NOTE — TELEPHONE ENCOUNTER
Pt states he moved to [place] and hasn't seen Dr Barnes recently  His rx was refused    Pt is asking if you can temporality fill the rx along went the dexcom sensors until he finds a new provider

## 2024-07-11 DIAGNOSIS — E10.65 TYPE 1 DIABETES MELLITUS WITH HYPERGLYCEMIA (HCC): ICD-10-CM

## 2024-07-11 RX ORDER — INSULIN ASPART 100 [IU]/ML
INJECTION, SOLUTION INTRAVENOUS; SUBCUTANEOUS
Qty: 15 ML | Refills: 0 | Status: SHIPPED | OUTPATIENT
Start: 2024-07-11

## 2024-07-11 NOTE — TELEPHONE ENCOUNTER
Recent Visits  Date Type Provider Dept   05/31/23 Office Visit Emil Dimas, APRN - CNP [facility]   Showing recent visits within past 540 days with a meds authorizing provider and meeting all other requirements  Future Appointments  No visits were found meeting these conditions.  Showing future appointments within next 150 days with a meds authorizing provider and meeting all other requirements